# Patient Record
Sex: FEMALE | Race: WHITE | NOT HISPANIC OR LATINO | Employment: OTHER | URBAN - METROPOLITAN AREA
[De-identification: names, ages, dates, MRNs, and addresses within clinical notes are randomized per-mention and may not be internally consistent; named-entity substitution may affect disease eponyms.]

---

## 2020-11-02 ENCOUNTER — TELEPHONE (OUTPATIENT)
Dept: CARDIOLOGY | Facility: CLINIC | Age: 70
End: 2020-11-02

## 2020-11-02 NOTE — TELEPHONE ENCOUNTER
[facility] request transfer of care to there office through [facility].

## 2022-01-13 ENCOUNTER — CLINICAL SUPPORT NO REQUIREMENTS (OUTPATIENT)
Dept: CARDIOLOGY | Facility: CLINIC | Age: 72
End: 2022-01-13

## 2022-01-13 ENCOUNTER — OFFICE VISIT (OUTPATIENT)
Dept: CARDIOLOGY | Facility: CLINIC | Age: 72
End: 2022-01-13

## 2022-01-13 VITALS
HEART RATE: 68 BPM | SYSTOLIC BLOOD PRESSURE: 124 MMHG | OXYGEN SATURATION: 97 % | HEIGHT: 63 IN | DIASTOLIC BLOOD PRESSURE: 69 MMHG | WEIGHT: 239 LBS | BODY MASS INDEX: 42.35 KG/M2

## 2022-01-13 DIAGNOSIS — R00.1 BRADYCARDIA, SINUS: ICD-10-CM

## 2022-01-13 DIAGNOSIS — Z95.0 PRESENCE OF CARDIAC PACEMAKER: ICD-10-CM

## 2022-01-13 DIAGNOSIS — E78.5 DYSLIPIDEMIA: ICD-10-CM

## 2022-01-13 DIAGNOSIS — I10 ESSENTIAL HYPERTENSION: ICD-10-CM

## 2022-01-13 DIAGNOSIS — Z95.0 STATUS POST PLACEMENT OF CARDIAC PACEMAKER: ICD-10-CM

## 2022-01-13 DIAGNOSIS — R07.89 CHEST DISCOMFORT: Primary | ICD-10-CM

## 2022-01-13 DIAGNOSIS — Z95.0 PACEMAKER: Primary | ICD-10-CM

## 2022-01-13 PROCEDURE — 93280 PM DEVICE PROGR EVAL DUAL: CPT | Performed by: INTERNAL MEDICINE

## 2022-01-13 PROCEDURE — 99204 OFFICE O/P NEW MOD 45 MIN: CPT | Performed by: INTERNAL MEDICINE

## 2022-01-13 RX ORDER — ASPIRIN 81 MG/1
81 TABLET ORAL DAILY
COMMUNITY

## 2022-01-13 RX ORDER — CITALOPRAM 40 MG/1
40 TABLET ORAL DAILY
COMMUNITY

## 2022-01-13 RX ORDER — GABAPENTIN 300 MG/1
300 CAPSULE ORAL 3 TIMES DAILY
COMMUNITY

## 2022-01-13 RX ORDER — AMLODIPINE BESYLATE 10 MG/1
10 TABLET ORAL DAILY
COMMUNITY

## 2022-01-13 RX ORDER — PREDNISONE 20 MG/1
TABLET ORAL
COMMUNITY
End: 2022-10-05

## 2022-01-13 RX ORDER — DEXLANSOPRAZOLE 60 MG/1
60 CAPSULE, DELAYED RELEASE ORAL DAILY
COMMUNITY

## 2022-01-13 RX ORDER — GLYBURIDE 5 MG/1
5 TABLET ORAL
COMMUNITY

## 2022-01-13 RX ORDER — LOSARTAN POTASSIUM 25 MG/1
25 TABLET ORAL DAILY
COMMUNITY

## 2022-01-13 RX ORDER — PSEUDOEPHEDRINE HCL 30 MG
TABLET ORAL
Status: ON HOLD | COMMUNITY
End: 2022-10-12

## 2022-01-13 RX ORDER — AMITRIPTYLINE HYDROCHLORIDE 25 MG/1
25 TABLET, FILM COATED ORAL DAILY
COMMUNITY

## 2022-01-13 RX ORDER — ROPINIROLE 0.25 MG/1
TABLET, FILM COATED ORAL
Status: ON HOLD | COMMUNITY
End: 2022-10-12

## 2022-01-13 RX ORDER — FUROSEMIDE 20 MG/1
20 TABLET ORAL DAILY
COMMUNITY

## 2022-01-13 RX ORDER — NITROGLYCERIN 0.4 MG/1
0.4 TABLET SUBLINGUAL
COMMUNITY
End: 2023-01-23 | Stop reason: SDUPTHER

## 2022-01-13 RX ORDER — CLOTRIMAZOLE 1 %
CREAM (GRAM) TOPICAL
Status: ON HOLD | COMMUNITY
End: 2022-10-12

## 2022-01-13 RX ORDER — POLYETHYLENE GLYCOL 3350 17 G/17G
17 POWDER, FOR SOLUTION ORAL DAILY PRN
COMMUNITY

## 2022-01-13 RX ORDER — TRAMADOL HYDROCHLORIDE 50 MG/1
50 TABLET ORAL EVERY 8 HOURS PRN
COMMUNITY

## 2022-01-13 RX ORDER — LORATADINE 10 MG/1
10 TABLET ORAL DAILY
COMMUNITY

## 2022-01-13 RX ORDER — MONTELUKAST SODIUM 10 MG/1
TABLET ORAL
Status: ON HOLD | COMMUNITY
End: 2022-10-12

## 2022-01-13 RX ORDER — OMEPRAZOLE 40 MG/1
40 CAPSULE, DELAYED RELEASE ORAL DAILY
COMMUNITY

## 2022-01-13 RX ORDER — INSULIN DEGLUDEC 200 U/ML
INJECTION, SOLUTION SUBCUTANEOUS
COMMUNITY
End: 2022-10-05

## 2022-01-13 RX ORDER — ALBUTEROL SULFATE 2.5 MG/3ML
2.5 SOLUTION RESPIRATORY (INHALATION) EVERY 4 HOURS PRN
COMMUNITY

## 2022-01-13 RX ORDER — POTASSIUM CHLORIDE 20 MEQ/1
20 TABLET, EXTENDED RELEASE ORAL DAILY
COMMUNITY

## 2022-01-13 RX ORDER — TRAZODONE HYDROCHLORIDE 50 MG/1
50 TABLET ORAL NIGHTLY PRN
COMMUNITY

## 2022-01-13 RX ORDER — HYDROCHLOROTHIAZIDE 50 MG/1
50 TABLET ORAL DAILY
COMMUNITY

## 2022-01-13 RX ORDER — LURASIDONE HYDROCHLORIDE 40 MG/1
40 TABLET, FILM COATED ORAL DAILY
COMMUNITY

## 2022-01-13 RX ORDER — ALBUTEROL SULFATE 90 UG/1
1 AEROSOL, METERED RESPIRATORY (INHALATION) DAILY PRN
COMMUNITY

## 2022-01-13 RX ORDER — CYCLOBENZAPRINE HCL 10 MG
10 TABLET ORAL 3 TIMES DAILY PRN
COMMUNITY

## 2022-01-13 RX ORDER — ARIPIPRAZOLE 20 MG/1
20 TABLET ORAL
COMMUNITY
End: 2022-10-05

## 2022-01-13 NOTE — PROGRESS NOTES
Encounter Date:01/13/2022  Patient was last seen in the office in 2018.      Patient ID: Raya CONTRERAS is a 71 y.o. female.    Chief Complaint:  Chest discomfort  Status post pacemaker  Hypertension  Diabetes  Dyslipidemia      History of Present Illness  Recently patient has been having substernal sharp pain heaviness as though somebody sitting on her chest.  Nonexertional nonradiating.  No other associated aggravating or alleviating factors.    Patient is not having any shortness of breath, palpitations, dizziness or syncope.  Denies having any headache ,abdominal pain ,nausea, vomiting , diarrhea constipation, loss of weight or loss of appetite.  Denies having any excessive bruising ,hematuria or blood in the stool.    Review of all systems negative except as indicated.    Reviewed ROS.      Assessment and Plan     [[[[[[[[[[[[[[[[[[[[[[[[[[    Impression  ==========  -Chest discomfort-possible angina pectoris.  Cardiac catheterization 12/14/2016 revealed normal coronary arteries and normal left ventricular function.      -Status post permanent dual-chamber pacemaker implantation ([email or web address] performed in  [place], [place] ).  2010  Battery status is 18 months.      -Hypertension diabetes dyslipidemia obstructive sleep apnea and COPD      -strong family history of coronary artery disease     -history of pulmonary embolus and DVT in the past.     -Exogenous obesity     - status post appendectomy cholecystectomy partial hysterectomy tonsillectomy adenoidectomy carpal tunnel release bilaterally     -Past smoker     -allergy to morphine and morphine hydrocodone     - medical noncompliance in the past.  ================   plan  =============  Chest discomfort-possible angina pectoris.  Stress Cardiolite test  Echocardiogram    Status post dual-chamber pacemaker implantation 2010.  Interrogation of the pacemaker revealed battery status of 18 months.  Pacemaker site looks normal.    Hypertension- 124/69.    Diabetes-on  medications.    Medications were reviewed and updated.    Followup in the office on the same day.    Further plan will depend on patient's progress.  [[[[[[[[[[[[[[[[[[[[[[[[[[[[[[[             Diagnosis Plan   1. Chest discomfort  Adult Transthoracic Echo Complete W/ Cont if Necessary Per Protocol    Stress Test With Myocardial Perfusion One Day   2. Presence of cardiac pacemaker  Adult Transthoracic Echo Complete W/ Cont if Necessary Per Protocol    Stress Test With Myocardial Perfusion One Day   3. Essential hypertension  Adult Transthoracic Echo Complete W/ Cont if Necessary Per Protocol    Stress Test With Myocardial Perfusion One Day   4. Dyslipidemia  Adult Transthoracic Echo Complete W/ Cont if Necessary Per Protocol    Stress Test With Myocardial Perfusion One Day   5. Status post placement of cardiac pacemaker     LAB RESULTS (LAST 7 DAYS)    CBC        BMP        CMP         BNP        TROPONIN        CoAg        Creatinine Clearance  CrCl cannot be calculated (No successful lab value found.).    ABG        Radiology  No radiology results for the last day                The following portions of the patient's history were reviewed and updated as appropriate: allergies, current medications, past family history, past medical history, past social history, past surgical history and problem list.    Review of Systems   Constitutional: Positive for decreased appetite, malaise/fatigue and weight loss. Negative for fever.   Cardiovascular: Positive for chest pain and leg swelling. Negative for dyspnea on exertion and palpitations.   Respiratory: Positive for shortness of breath. Negative for cough.    Endocrine: Positive for polydipsia and polyphagia.   Skin: Negative for rash.   Musculoskeletal: Positive for back pain, joint pain, joint swelling, neck pain and stiffness.   Gastrointestinal: Negative for abdominal pain, nausea and vomiting.   Genitourinary: Positive for bladder incontinence and urgency.    Neurological: Positive for dizziness and headaches. Negative for focal weakness.   Psychiatric/Behavioral: The patient is nervous/anxious.    All other systems reviewed and are negative.        Current Outpatient Medications:   •  albuterol (PROVENTIL) (2.5 MG/3ML) 0.083% nebulizer solution, albuterol sulfate 2.5 mg/3 mL (0.083 %) solution for nebulization, Disp: , Rfl:   •  albuterol sulfate  (90 Base) MCG/ACT inhaler, albuterol sulfate HFA 90 mcg/actuation aerosol inhaler, Disp: , Rfl:   •  amitriptyline (ELAVIL) 25 MG tablet, amitriptyline 25 mg tablet, Disp: , Rfl:   •  amLODIPine (NORVASC) 10 MG tablet, amlodipine 10 mg tablet  TAKE ONE TABLET BY MOUTH EVERY DAY, Disp: , Rfl:   •  ARIPiprazole (ABILIFY) 20 MG tablet, Take 20 mg by mouth., Disp: , Rfl:   •  aspirin (aspirin) 81 MG EC tablet, aspirin 81 mg tablet,delayed release  Take 1 tablet every day by oral route., Disp: , Rfl:   •  citalopram (CeleXA) 40 MG tablet, citalopram 40 mg tablet, Disp: , Rfl:   •  clotrimazole (LOTRIMIN) 1 % cream, clotrimazole 1 % topical cream, Disp: , Rfl:   •  cyclobenzaprine (FLEXERIL) 10 MG tablet, cyclobenzaprine 10 mg tablet  TAKE ONE TABLET BY MOUTH THREE TIMES DAILY AS NEEDED FOR 10 DAYS, Disp: , Rfl:   •  dexlansoprazole (Dexilant) 60 MG capsule, Dexilant 60 mg capsule, delayed release  TAKE 1 CAPSULE BY MOUTH ONCE DAILY, Disp: , Rfl:   •  diclofenac (VOLTAREN) 50 MG EC tablet, diclofenac sodium 50 mg tablet,delayed release  TAKE ONE TABLET BY MOUTH TWICE DAILY AS NEEDED, Disp: , Rfl:   •  docusate sodium 100 MG capsule, docusate sodium 100 mg capsule  Take 1 capsule every day by oral route as needed for 30 days., Disp: , Rfl:   •  furosemide (Lasix) 20 MG tablet, Lasix 20 mg tablet  Take 1 tablet every day by oral route as directed for 30 days., Disp: , Rfl:   •  gabapentin (NEURONTIN) 300 MG capsule, gabapentin 300 mg capsule  TAKE ONE CAPSULE BY MOUTH THREE TIMES DAILY, Disp: , Rfl:   •  glyburide (DIAbeta) 5  MG tablet, glyburide 5 mg tablet, Disp: , Rfl:   •  hydroCHLOROthiazide (HYDRODIURIL) 50 MG tablet, hydrochlorothiazide 50 mg tablet  Take 1 tablet every day by oral route., Disp: , Rfl:   •  Insulin Degludec (Tresiba FlexTouch) 200 UNIT/ML solution pen-injector pen injection, Tresiba FlexTouch U-200 insulin 200 unit/mL (3 mL) subcutaneous pen  INJECT 40 UNITS SUBCUTANEOUSLY EVERY DAY, Disp: , Rfl:   •  loratadine (CLARITIN) 10 MG tablet, loratadine 10 mg tablet  TAKE ONE TABLET BY MOUTH EVERY DAY, Disp: , Rfl:   •  losartan (COZAAR) 25 MG tablet, losartan 25 mg tablet, Disp: , Rfl:   •  lurasidone (Latuda) 40 MG tablet tablet, Latuda 40 mg tablet  TAKE ONE TABLET BY MOUTH ONCE DAILY, Disp: , Rfl:   •  montelukast (SINGULAIR) 10 MG tablet, montelukast 10 mg tablet  TAKE ONE TABLET BY MOUTH EVERY DAY AS DIRECTED, Disp: , Rfl:   •  nitroglycerin (NITROSTAT) 0.4 MG SL tablet, nitroglycerin 0.4 mg sublingual tablet  DISSOLVE ONE TABLET UNDER THE TONGUE EVERY 5 MINUTES AS NEEDED FOR CHEST PAIN, Disp: , Rfl:   •  omeprazole (priLOSEC) 40 MG capsule, omeprazole 40 mg capsule,delayed release  TAKE ONE CAPSULE BY MOUTH EVERY DAY, Disp: , Rfl:   •  polyethylene glycol (MiraLax) 17 GM/SCOOP powder, 17 g., Disp: , Rfl:   •  potassium chloride (K-DUR,KLOR-CON) 20 MEQ CR tablet, potassium chloride ER 20 mEq tablet,extended release(part/cryst)  TAKE ONE TABLET BY MOUTH EVERY DAY, Disp: , Rfl:   •  predniSONE (DELTASONE) 20 MG tablet, prednisone 20 mg tablet  TAKE ONE TABLET BY MOUTH EVERY DAY, Disp: , Rfl:   •  rOPINIRole (Requip) 0.25 MG tablet, Requip 0.25 mg tablet  Start 1 tablet by mouth nightly x 2 days, then increase to 2 tablets nightly x 5 days, then increase to 4 tablets nightly., Disp: , Rfl:   •  traMADol (ULTRAM) 50 MG tablet, tramadol 50 mg tablet  TAKE 1 TABLET BY MOUTH THREE TIMES DAILY AS NEEDED, Disp: , Rfl:   •  traZODone (DESYREL) 50 MG tablet, trazodone 50 mg tablet  TAKE ONE TABLET BY MOUTH AT BEDTIME AS  "NEEDED, Disp: , Rfl:     Allergies   Allergen Reactions   • Hydrocodone-Acetaminophen Rash   • Morphine Rash   • Penicillins Rash       Family History   Problem Relation Age of Onset   • Arthritis Mother    • Hypertension Mother    • Heart disease Mother    • Cancer Mother    • Diabetes Mother    • Gout Father    • Heart failure Father    • Heart disease Father    • Diabetes Father        Past Surgical History:   Procedure Laterality Date   • APPENDECTOMY     • CHOLECYSTECTOMY     • DORSAL COMPARTMENT RELEASE     • INCONTINENCE SURGERY     • INSERT / REPLACE / REMOVE PACEMAKER     • TONSILLECTOMY AND ADENOIDECTOMY         Past Medical History:   Diagnosis Date   • Arthritis    • Asthma    • Bronchitis    • Claustrophobia    • Coronary artery disease    • Diabetes mellitus (HCC)    • Emphysema/COPD (HCC)    • Heart failure (HCC)    • History of back pain    • History of bladder infections    • Hypertension    • Myocardial infarction (HCC)    • Osteoporosis    • Pneumonia    • Stroke (HCC)        Family History   Problem Relation Age of Onset   • Arthritis Mother    • Hypertension Mother    • Heart disease Mother    • Cancer Mother    • Diabetes Mother    • Gout Father    • Heart failure Father    • Heart disease Father    • Diabetes Father        Social History     Socioeconomic History   • Marital status:    Tobacco Use   • Smoking status: Former Smoker     Packs/day: 1.00     Years: 60.00     Pack years: 60.00     Types: Cigarettes     Quit date:      Years since quittin.0   • Smokeless tobacco: Never Used   Vaping Use   • Vaping Use: Never used   Substance and Sexual Activity   • Alcohol use: Yes   • Drug use: Never   • Sexual activity: Defer         Procedures      Objective:       Physical Exam    /69 (BP Location: Left arm, Patient Position: Sitting, Cuff Size: Large Adult)   Pulse 68   Ht 160 cm (63\")   Wt 108 kg (239 lb)   SpO2 97%   BMI 42.34 kg/m²   The patient is alert, oriented " and in no distress.    Vital signs as noted above.    Head and neck revealed no carotid bruits or jugular venous distension.  No thyromegaly or lymphadenopathy is present.    Lungs clear.  No wheezing.  Breath sounds are normal bilaterally.    Heart normal first and second heart sounds.  No murmur..  No pericardial rub is present.  No gallop is present.    Abdomen soft and nontender.  No organomegaly is present.    Extremities revealed good peripheral pulses without any pedal edema.    Skin warm and dry.  Pacemaker site looks normal.    Musculoskeletal system is grossly normal.    CNS grossly normal.

## 2022-01-19 ENCOUNTER — TELEPHONE (OUTPATIENT)
Dept: CARDIOLOGY | Facility: CLINIC | Age: 72
End: 2022-01-19

## 2022-01-26 ENCOUNTER — APPOINTMENT (OUTPATIENT)
Dept: CARDIOLOGY | Facility: HOSPITAL | Age: 72
End: 2022-01-26

## 2022-03-09 PROCEDURE — 93294 REM INTERROG EVL PM/LDLS PM: CPT | Performed by: INTERNAL MEDICINE

## 2022-03-09 PROCEDURE — 93296 REM INTERROG EVL PM/IDS: CPT | Performed by: INTERNAL MEDICINE

## 2022-03-16 ENCOUNTER — APPOINTMENT (OUTPATIENT)
Dept: CARDIOLOGY | Facility: HOSPITAL | Age: 72
End: 2022-03-16

## 2022-03-16 ENCOUNTER — APPOINTMENT (OUTPATIENT)
Dept: NUCLEAR MEDICINE | Facility: HOSPITAL | Age: 72
End: 2022-03-16

## 2022-03-16 ENCOUNTER — HOSPITAL ENCOUNTER (OUTPATIENT)
Dept: NUCLEAR MEDICINE | Facility: HOSPITAL | Age: 72
End: 2022-03-16

## 2022-04-06 ENCOUNTER — HOSPITAL ENCOUNTER (OUTPATIENT)
Dept: NUCLEAR MEDICINE | Facility: HOSPITAL | Age: 72
Discharge: HOME | End: 2022-04-06

## 2022-04-06 ENCOUNTER — OFFICE VISIT (OUTPATIENT)
Dept: CARDIOLOGY | Facility: CLINIC | Age: 72
End: 2022-04-06

## 2022-04-06 ENCOUNTER — HOSPITAL ENCOUNTER (OUTPATIENT)
Dept: CARDIOLOGY | Facility: HOSPITAL | Age: 72
Discharge: HOME | End: 2022-04-06

## 2022-04-06 VITALS
HEIGHT: 63 IN | WEIGHT: 241 LBS | BODY MASS INDEX: 42.7 KG/M2 | OXYGEN SATURATION: 96 % | DIASTOLIC BLOOD PRESSURE: 87 MMHG | HEART RATE: 88 BPM | SYSTOLIC BLOOD PRESSURE: 155 MMHG

## 2022-04-06 DIAGNOSIS — Z95.0 STATUS POST PLACEMENT OF CARDIAC PACEMAKER: ICD-10-CM

## 2022-04-06 DIAGNOSIS — I10 ESSENTIAL HYPERTENSION: ICD-10-CM

## 2022-04-06 DIAGNOSIS — R07.89 CHEST DISCOMFORT: ICD-10-CM

## 2022-04-06 DIAGNOSIS — E78.5 DYSLIPIDEMIA: ICD-10-CM

## 2022-04-06 DIAGNOSIS — Z95.0 PRESENCE OF CARDIAC PACEMAKER: ICD-10-CM

## 2022-04-06 DIAGNOSIS — R07.89 CHEST DISCOMFORT: Primary | ICD-10-CM

## 2022-04-06 LAB
BH CV ECHO MEAS - ACS: 2.15 CM
BH CV ECHO MEAS - AO MAX PG: 5.7 MMHG
BH CV ECHO MEAS - AO MEAN PG: 2.6 MMHG
BH CV ECHO MEAS - AO ROOT DIAM: 3 CM
BH CV ECHO MEAS - AO V2 MAX: 119.3 CM/SEC
BH CV ECHO MEAS - AO V2 VTI: 29.3 CM
BH CV ECHO MEAS - AVA(I,D): 3.1 CM2
BH CV ECHO MEAS - EDV(CUBED): 108.5 ML
BH CV ECHO MEAS - EDV(MOD-SP4): 86.6 ML
BH CV ECHO MEAS - EF(MOD-BP): 54 %
BH CV ECHO MEAS - EF(MOD-SP4): 53.8 %
BH CV ECHO MEAS - ESV(CUBED): 28.2 ML
BH CV ECHO MEAS - ESV(MOD-SP4): 40 ML
BH CV ECHO MEAS - FS: 36.2 %
BH CV ECHO MEAS - IVS/LVPW: 0.76 CM
BH CV ECHO MEAS - IVSD: 0.87 CM
BH CV ECHO MEAS - LA DIMENSION(2D): 3.6 CM
BH CV ECHO MEAS - LV DIASTOLIC VOL/BSA (35-75): 42.2 CM2
BH CV ECHO MEAS - LV MASS(C)D: 169.5 GRAMS
BH CV ECHO MEAS - LV MAX PG: 4.1 MMHG
BH CV ECHO MEAS - LV MEAN PG: 1.96 MMHG
BH CV ECHO MEAS - LV SYSTOLIC VOL/BSA (12-30): 19.5 CM2
BH CV ECHO MEAS - LV V1 MAX: 101.2 CM/SEC
BH CV ECHO MEAS - LV V1 VTI: 29 CM
BH CV ECHO MEAS - LVIDD: 4.8 CM
BH CV ECHO MEAS - LVIDS: 3 CM
BH CV ECHO MEAS - LVOT AREA: 3.1 CM2
BH CV ECHO MEAS - LVOT DIAM: 1.99 CM
BH CV ECHO MEAS - LVPWD: 1.14 CM
BH CV ECHO MEAS - MV A MAX VEL: 108.7 CM/SEC
BH CV ECHO MEAS - MV DEC SLOPE: 201.4 CM/SEC2
BH CV ECHO MEAS - MV DEC TIME: 0.29 MSEC
BH CV ECHO MEAS - MV E MAX VEL: 59.2 CM/SEC
BH CV ECHO MEAS - MV E/A: 0.54
BH CV ECHO MEAS - MV MAX PG: 4.5 MMHG
BH CV ECHO MEAS - MV MEAN PG: 1.46 MMHG
BH CV ECHO MEAS - MV V2 VTI: 27.1 CM
BH CV ECHO MEAS - MVA(VTI): 3.3 CM2
BH CV ECHO MEAS - PA V2 MAX: 98 CM/SEC
BH CV ECHO MEAS - RAP SYSTOLE: 3 MMHG
BH CV ECHO MEAS - RV MAX PG: 1.78 MMHG
BH CV ECHO MEAS - RV V1 MAX: 66.7 CM/SEC
BH CV ECHO MEAS - RV V1 VTI: 17.3 CM
BH CV ECHO MEAS - RVDD: 3.1 CM
BH CV ECHO MEAS - RVSP: 29.7 MMHG
BH CV ECHO MEAS - SI(MOD-SP4): 22.7 ML/M2
BH CV ECHO MEAS - SV(LVOT): 90.2 ML
BH CV ECHO MEAS - SV(MOD-SP4): 46.5 ML
BH CV ECHO MEAS - TR MAX PG: 26.7 MMHG
BH CV ECHO MEAS - TR MAX VEL: 258.2 CM/SEC
BH CV REST NUCLEAR ISOTOPE DOSE: 7.4 MCI
BH CV STRESS BP STAGE 1: NORMAL
BH CV STRESS BP STAGE 2: NORMAL
BH CV STRESS BP STAGE 3: NORMAL
BH CV STRESS BP STAGE 4: NORMAL
BH CV STRESS COMMENTS STAGE 1: NORMAL
BH CV STRESS COMMENTS STAGE 2: NORMAL
BH CV STRESS DOSE REGADENOSON STAGE 1: 0.4
BH CV STRESS DURATION MIN STAGE 1: 0
BH CV STRESS DURATION MIN STAGE 2: 4
BH CV STRESS DURATION SEC STAGE 1: 10
BH CV STRESS DURATION SEC STAGE 2: 0
BH CV STRESS HR STAGE 1: 72
BH CV STRESS HR STAGE 2: 70
BH CV STRESS HR STAGE 3: 61
BH CV STRESS HR STAGE 4: 61
BH CV STRESS NUCLEAR ISOTOPE DOSE: 21.9 MCI
BH CV STRESS PROTOCOL 1: NORMAL
BH CV STRESS RECOVERY BP: NORMAL MMHG
BH CV STRESS RECOVERY HR: 61 BPM
BH CV STRESS STAGE 1: 1
BH CV STRESS STAGE 2: 2
BH CV STRESS STAGE 3: 3
BH CV STRESS STAGE 4: 4
LV EF 2D ECHO EST: 60 %
MAXIMAL PREDICTED HEART RATE: 151 BPM
MAXIMAL PREDICTED HEART RATE: 151 BPM
PERCENT MAX PREDICTED HR: 47.68 %
STRESS BASELINE BP: NORMAL MMHG
STRESS BASELINE HR: 62 BPM
STRESS PERCENT HR: 56 %
STRESS POST PEAK BP: NORMAL MMHG
STRESS POST PEAK HR: 72 BPM
STRESS TARGET HR: 128 BPM
STRESS TARGET HR: 128 BPM

## 2022-04-06 PROCEDURE — A9502 TC99M TETROFOSMIN: HCPCS | Performed by: INTERNAL MEDICINE

## 2022-04-06 PROCEDURE — 0 TECHNETIUM TETROFOSMIN KIT: Performed by: INTERNAL MEDICINE

## 2022-04-06 PROCEDURE — 93018 CV STRESS TEST I&R ONLY: CPT | Performed by: INTERNAL MEDICINE

## 2022-04-06 PROCEDURE — 93017 CV STRESS TEST TRACING ONLY: CPT

## 2022-04-06 PROCEDURE — 93306 TTE W/DOPPLER COMPLETE: CPT

## 2022-04-06 PROCEDURE — 99213 OFFICE O/P EST LOW 20 MIN: CPT | Performed by: INTERNAL MEDICINE

## 2022-04-06 PROCEDURE — 78452 HT MUSCLE IMAGE SPECT MULT: CPT | Performed by: INTERNAL MEDICINE

## 2022-04-06 PROCEDURE — 78452 HT MUSCLE IMAGE SPECT MULT: CPT

## 2022-04-06 PROCEDURE — 93306 TTE W/DOPPLER COMPLETE: CPT | Performed by: INTERNAL MEDICINE

## 2022-04-06 PROCEDURE — 25010000002 REGADENOSON 0.4 MG/5ML SOLUTION: Performed by: INTERNAL MEDICINE

## 2022-04-06 RX ADMIN — TETROFOSMIN 1 DOSE: 1.38 INJECTION, POWDER, LYOPHILIZED, FOR SOLUTION INTRAVENOUS at 11:06

## 2022-04-06 RX ADMIN — REGADENOSON 0.4 MG: 0.08 INJECTION, SOLUTION INTRAVENOUS at 11:06

## 2022-04-06 RX ADMIN — TETROFOSMIN 1 DOSE: 1.38 INJECTION, POWDER, LYOPHILIZED, FOR SOLUTION INTRAVENOUS at 09:55

## 2022-04-06 NOTE — PROGRESS NOTES
Encounter Date:04/06/2022      Patient ID: Raya May is a 71 y.o. female.    Chief Complaint:  Chest discomfort  Status post pacemaker  Hypertension  Diabetes  Dyslipidemia        History of Present Illness  Patient recently was seen with following history.  Reviewed and updated.    Recently patient has been having substernal sharp pain heaviness as though somebody sitting on her chest.  Nonexertional nonradiating.  No other associated aggravating or alleviating factors.     Patient is not having any shortness of breath, palpitations, dizziness or syncope.  Denies having any headache ,abdominal pain ,nausea, vomiting , diarrhea constipation, loss of weight or loss of appetite.  Denies having any excessive bruising ,hematuria or blood in the stool.     Review of all systems negative except as indicated.     Reviewed ROS.        Assessment and Plan      [[[[[[[[[[[[[[[[[[[[[[[[[[    Impression  ==========  -Chest discomfort-possible angina pectoris.  Cardiac catheterization 12/14/2016 revealed normal coronary arteries and normal left ventricular function.  Echocardiogram-normal 4/6/2022.  Lexiscan Cardiolite test-normal 4/6/2022       -Status post permanent dual-chamber pacemaker implantation ([employer] performed in  [place], [place] ).  2010  Battery status is 18 months.      -Hypertension diabetes dyslipidemia obstructive sleep apnea and COPD      -strong family history of coronary artery disease     -history of pulmonary embolus and DVT in the past.     -Exogenous obesity     - status post appendectomy cholecystectomy partial hysterectomy tonsillectomy adenoidectomy carpal tunnel release bilaterally     -Past smoker     -allergy to morphine and morphine hydrocodone     - medical noncompliance in the past.  ================   plan  =============  Chest discomfort-possible angina pectoris.  Stress Cardiolite test-normal as above  Echocardiogram-normal as above     Status post dual-chamber pacemaker implantation  2010.  Interrogation of the pacemaker revealed battery status of 18 months.  Pacemaker site looks normal.     Hypertension- 124/69.     Diabetes-on medications.     Medications were reviewed and updated.     Followup in the office  weeks.  Consider cardiac catheterization if patient has continued symptoms.     Further plan will depend on patient's progress.  [[[[[[[[[[[[[[[[[[[[[[[[[[[[[[[                Diagnosis Plan   1. Chest discomfort     2. Presence of cardiac pacemaker     3. Essential hypertension     4. Dyslipidemia     5. Status post placement of cardiac pacemaker     LAB RESULTS (LAST 7 DAYS)    CBC        BMP        CMP         BNP        TROPONIN        CoAg        Creatinine Clearance  CrCl cannot be calculated (No successful lab value found.).    ABG        Radiology  No radiology results for the last day                The following portions of the patient's history were reviewed and updated as appropriate: allergies, current medications, past family history, past medical history, past social history, past surgical history and problem list.    Review of Systems   Constitutional: Negative for malaise/fatigue.   Cardiovascular: Negative for chest pain, leg swelling, palpitations and syncope.   Respiratory: Negative for shortness of breath.    Skin: Negative for rash.   Gastrointestinal: Negative for nausea and vomiting.   Neurological: Negative for dizziness, light-headedness and numbness.   All other systems reviewed and are negative.        Current Outpatient Medications:   •  albuterol (PROVENTIL) (2.5 MG/3ML) 0.083% nebulizer solution, albuterol sulfate 2.5 mg/3 mL (0.083 %) solution for nebulization, Disp: , Rfl:   •  albuterol sulfate  (90 Base) MCG/ACT inhaler, albuterol sulfate HFA 90 mcg/actuation aerosol inhaler, Disp: , Rfl:   •  amitriptyline (ELAVIL) 25 MG tablet, amitriptyline 25 mg tablet, Disp: , Rfl:   •  amLODIPine (NORVASC) 10 MG tablet, amlodipine 10 mg tablet  TAKE ONE  TABLET BY MOUTH EVERY DAY, Disp: , Rfl:   •  ARIPiprazole (ABILIFY) 20 MG tablet, Take 20 mg by mouth., Disp: , Rfl:   •  aspirin 81 MG EC tablet, aspirin 81 mg tablet,delayed release  Take 1 tablet every day by oral route., Disp: , Rfl:   •  citalopram (CeleXA) 40 MG tablet, citalopram 40 mg tablet, Disp: , Rfl:   •  clotrimazole (LOTRIMIN) 1 % cream, clotrimazole 1 % topical cream, Disp: , Rfl:   •  cyclobenzaprine (FLEXERIL) 10 MG tablet, cyclobenzaprine 10 mg tablet  TAKE ONE TABLET BY MOUTH THREE TIMES DAILY AS NEEDED FOR 10 DAYS, Disp: , Rfl:   •  dexlansoprazole (DEXILANT) 60 MG capsule, Dexilant 60 mg capsule, delayed release  TAKE 1 CAPSULE BY MOUTH ONCE DAILY, Disp: , Rfl:   •  diclofenac (VOLTAREN) 50 MG EC tablet, diclofenac sodium 50 mg tablet,delayed release  TAKE ONE TABLET BY MOUTH TWICE DAILY AS NEEDED, Disp: , Rfl:   •  docusate sodium 100 MG capsule, docusate sodium 100 mg capsule  Take 1 capsule every day by oral route as needed for 30 days., Disp: , Rfl:   •  furosemide (LASIX) 20 MG tablet, Lasix 20 mg tablet  Take 1 tablet every day by oral route as directed for 30 days., Disp: , Rfl:   •  gabapentin (NEURONTIN) 300 MG capsule, gabapentin 300 mg capsule  TAKE ONE CAPSULE BY MOUTH THREE TIMES DAILY, Disp: , Rfl:   •  glyburide (DIAbeta) 5 MG tablet, glyburide 5 mg tablet, Disp: , Rfl:   •  hydroCHLOROthiazide (HYDRODIURIL) 50 MG tablet, hydrochlorothiazide 50 mg tablet  Take 1 tablet every day by oral route., Disp: , Rfl:   •  Insulin Degludec (Tresiba FlexTouch) 200 UNIT/ML solution pen-injector pen injection, [employer] FlexTouch U-200 insulin 200 unit/mL (3 mL) subcutaneous pen  INJECT 40 UNITS SUBCUTANEOUSLY EVERY DAY, Disp: , Rfl:   •  loratadine (CLARITIN) 10 MG tablet, loratadine 10 mg tablet  TAKE ONE TABLET BY MOUTH EVERY DAY, Disp: , Rfl:   •  losartan (COZAAR) 25 MG tablet, losartan 25 mg tablet, Disp: , Rfl:   •  lurasidone (LATUDA) 40 MG tablet tablet, Latuda 40 mg tablet  TAKE ONE  TABLET BY MOUTH ONCE DAILY, Disp: , Rfl:   •  montelukast (SINGULAIR) 10 MG tablet, montelukast 10 mg tablet  TAKE ONE TABLET BY MOUTH EVERY DAY AS DIRECTED, Disp: , Rfl:   •  nitroglycerin (NITROSTAT) 0.4 MG SL tablet, nitroglycerin 0.4 mg sublingual tablet  DISSOLVE ONE TABLET UNDER THE TONGUE EVERY 5 MINUTES AS NEEDED FOR CHEST PAIN, Disp: , Rfl:   •  omeprazole (priLOSEC) 40 MG capsule, omeprazole 40 mg capsule,delayed release  TAKE ONE CAPSULE BY MOUTH EVERY DAY, Disp: , Rfl:   •  polyethylene glycol (MIRALAX) 17 GM/SCOOP powder, 17 g., Disp: , Rfl:   •  potassium chloride (K-DUR,KLOR-CON) 20 MEQ CR tablet, potassium chloride ER 20 mEq tablet,extended release(part/cryst)  TAKE ONE TABLET BY MOUTH EVERY DAY, Disp: , Rfl:   •  predniSONE (DELTASONE) 20 MG tablet, prednisone 20 mg tablet  TAKE ONE TABLET BY MOUTH EVERY DAY, Disp: , Rfl:   •  rOPINIRole (REQUIP) 0.25 MG tablet, Requip 0.25 mg tablet  Start 1 tablet by mouth nightly x 2 days, then increase to 2 tablets nightly x 5 days, then increase to 4 tablets nightly., Disp: , Rfl:   •  traMADol (ULTRAM) 50 MG tablet, tramadol 50 mg tablet  TAKE 1 TABLET BY MOUTH THREE TIMES DAILY AS NEEDED, Disp: , Rfl:   •  traZODone (DESYREL) 50 MG tablet, trazodone 50 mg tablet  TAKE ONE TABLET BY MOUTH AT BEDTIME AS NEEDED, Disp: , Rfl:   No current facility-administered medications for this visit.    Allergies   Allergen Reactions   • Hydrocodone-Acetaminophen Rash   • Morphine Rash   • Penicillins Rash       Family History   Problem Relation Age of Onset   • Arthritis Mother    • Hypertension Mother    • Heart disease Mother    • Cancer Mother    • Diabetes Mother    • Gout Father    • Heart failure Father    • Heart disease Father    • Diabetes Father        Past Surgical History:   Procedure Laterality Date   • APPENDECTOMY     • CHOLECYSTECTOMY     • DORSAL COMPARTMENT RELEASE     • INCONTINENCE SURGERY     • INSERT / REPLACE / REMOVE PACEMAKER     • TONSILLECTOMY AND  "ADENOIDECTOMY         Past Medical History:   Diagnosis Date   • Arthritis    • Asthma    • Bronchitis    • Claustrophobia    • Coronary artery disease    • Diabetes mellitus (HCC)    • Emphysema/COPD (HCC)    • Heart failure (HCC)    • History of back pain    • History of bladder infections    • Hypertension    • Myocardial infarction (HCC)    • Osteoporosis    • Pneumonia    • Stroke (HCC)        Family History   Problem Relation Age of Onset   • Arthritis Mother    • Hypertension Mother    • Heart disease Mother    • Cancer Mother    • Diabetes Mother    • Gout Father    • Heart failure Father    • Heart disease Father    • Diabetes Father        Social History     Socioeconomic History   • Marital status:    Tobacco Use   • Smoking status: Former Smoker     Packs/day: 1.00     Years: 60.00     Pack years: 60.00     Types: Cigarettes     Quit date:      Years since quittin.2   • Smokeless tobacco: Never Used   Vaping Use   • Vaping Use: Never used   Substance and Sexual Activity   • Alcohol use: Yes   • Drug use: Never   • Sexual activity: Defer         Procedures      Objective:       Physical Exam    /87 (BP Location: Right arm, Patient Position: Sitting, Cuff Size: Adult) Comment (BP Location): LOWER ARM  Pulse 88   Ht 160 cm (63\")   Wt 109 kg (241 lb)   SpO2 96%   BMI 42.69 kg/m²   The patient is alert, oriented and in no distress.    Vital signs as noted above.    Head and neck revealed no carotid bruits or jugular venous distension.  No thyromegaly or lymphadenopathy is present.    Lungs clear.  No wheezing.  Breath sounds are normal bilaterally.    Heart normal first and second heart sounds.  No murmur..  No pericardial rub is present.  No gallop is present.    Abdomen soft and nontender.  No organomegaly is present.    Extremities revealed good peripheral pulses without any pedal edema.    Skin warm and dry.  Pacemaker site looks normal.    Musculoskeletal system is grossly " normal.    CNS grossly normal.    Reviewed and updated.

## 2022-09-07 PROCEDURE — 93296 REM INTERROG EVL PM/IDS: CPT | Performed by: INTERNAL MEDICINE

## 2022-09-07 PROCEDURE — 93294 REM INTERROG EVL PM/LDLS PM: CPT | Performed by: INTERNAL MEDICINE

## 2022-10-05 ENCOUNTER — TELEPHONE (OUTPATIENT)
Dept: CARDIOLOGY | Facility: CLINIC | Age: 72
End: 2022-10-05

## 2022-10-05 ENCOUNTER — CLINICAL SUPPORT NO REQUIREMENTS (OUTPATIENT)
Dept: CARDIOLOGY | Facility: CLINIC | Age: 72
End: 2022-10-05

## 2022-10-05 ENCOUNTER — OFFICE VISIT (OUTPATIENT)
Dept: CARDIOLOGY | Facility: CLINIC | Age: 72
End: 2022-10-05

## 2022-10-05 VITALS
HEART RATE: 71 BPM | OXYGEN SATURATION: 95 % | HEIGHT: 63 IN | SYSTOLIC BLOOD PRESSURE: 144 MMHG | DIASTOLIC BLOOD PRESSURE: 73 MMHG | BODY MASS INDEX: 44.03 KG/M2 | WEIGHT: 248.5 LBS

## 2022-10-05 DIAGNOSIS — Z95.0 STATUS POST PLACEMENT OF CARDIAC PACEMAKER: ICD-10-CM

## 2022-10-05 DIAGNOSIS — Z95.0 PRESENCE OF CARDIAC PACEMAKER: ICD-10-CM

## 2022-10-05 DIAGNOSIS — R00.1 BRADYCARDIA, SINUS: ICD-10-CM

## 2022-10-05 DIAGNOSIS — I10 ESSENTIAL HYPERTENSION: ICD-10-CM

## 2022-10-05 DIAGNOSIS — Z95.0 PACEMAKER: Primary | ICD-10-CM

## 2022-10-05 DIAGNOSIS — Z95.0 PACEMAKER: ICD-10-CM

## 2022-10-05 DIAGNOSIS — R07.89 CHEST DISCOMFORT: Primary | ICD-10-CM

## 2022-10-05 DIAGNOSIS — E78.5 DYSLIPIDEMIA: ICD-10-CM

## 2022-10-05 PROCEDURE — 99214 OFFICE O/P EST MOD 30 MIN: CPT | Performed by: INTERNAL MEDICINE

## 2022-10-05 PROCEDURE — 93280 PM DEVICE PROGR EVAL DUAL: CPT | Performed by: INTERNAL MEDICINE

## 2022-10-05 PROCEDURE — 93000 ELECTROCARDIOGRAM COMPLETE: CPT | Performed by: INTERNAL MEDICINE

## 2022-10-05 RX ORDER — LISINOPRIL 40 MG/1
40 TABLET ORAL
Status: ON HOLD | COMMUNITY
End: 2022-10-12

## 2022-10-05 RX ORDER — INSULIN DEGLUDEC INJECTION 100 U/ML
35 INJECTION, SOLUTION SUBCUTANEOUS
COMMUNITY
Start: 2022-09-22

## 2022-10-05 RX ORDER — ARIPIPRAZOLE 10 MG/1
10 TABLET ORAL DAILY
COMMUNITY

## 2022-10-05 RX ORDER — GUAIFENESIN AND CODEINE PHOSPHATE 100; 10 MG/5ML; MG/5ML
10 SOLUTION ORAL EVERY 4 HOURS PRN
COMMUNITY

## 2022-10-05 RX ORDER — LUMATEPERONE 42 MG/1
CAPSULE ORAL
Status: ON HOLD | COMMUNITY
End: 2022-10-12

## 2022-10-05 RX ORDER — BENZONATATE 200 MG/1
CAPSULE ORAL EVERY 8 HOURS SCHEDULED
Status: ON HOLD | COMMUNITY
End: 2022-10-12

## 2022-10-05 RX ORDER — AZITHROMYCIN 250 MG/1
250 TABLET, FILM COATED ORAL DAILY
COMMUNITY

## 2022-10-05 RX ORDER — HYDROCODONE BITARTRATE AND ACETAMINOPHEN 5; 325 MG/1; MG/1
1 TABLET ORAL EVERY 12 HOURS PRN
COMMUNITY
Start: 2022-08-29

## 2022-10-05 RX ORDER — FLUCONAZOLE 150 MG/1
150 TABLET ORAL DAILY
COMMUNITY

## 2022-10-05 RX ORDER — LEVOFLOXACIN 500 MG/1
500 TABLET, FILM COATED ORAL DAILY
Status: ON HOLD | COMMUNITY
End: 2022-10-21

## 2022-10-05 RX ORDER — ONDANSETRON 8 MG/1
8 TABLET, ORALLY DISINTEGRATING ORAL EVERY 12 HOURS PRN
COMMUNITY

## 2022-10-05 RX ORDER — ISOSORBIDE MONONITRATE 30 MG/1
30 TABLET, EXTENDED RELEASE ORAL DAILY
COMMUNITY
Start: 2022-10-03

## 2022-10-05 NOTE — TELEPHONE ENCOUNTER
Called patient, LMOM to remind her to send a monthly pacemaker check at the beginning of the month to monitor battery status.

## 2022-10-10 ENCOUNTER — HOSPITAL ENCOUNTER (OUTPATIENT)
Dept: GENERAL RADIOLOGY | Facility: HOSPITAL | Age: 72
Discharge: HOME | End: 2022-10-10

## 2022-10-10 ENCOUNTER — HOSPITAL ENCOUNTER (EMERGENCY)
Facility: HOSPITAL | Age: 72
Discharge: LEFT BEFORE DISCHARGE | End: 2022-10-10
Attending: EMERGENCY MEDICINE | Admitting: EMERGENCY MEDICINE

## 2022-10-10 ENCOUNTER — NURSE TRIAGE (OUTPATIENT)
Dept: CALL CENTER | Facility: HOSPITAL | Age: 72
End: 2022-10-10

## 2022-10-10 ENCOUNTER — LAB (OUTPATIENT)
Dept: LAB | Facility: HOSPITAL | Age: 72
End: 2022-10-10

## 2022-10-10 ENCOUNTER — TELEPHONE (OUTPATIENT)
Dept: CARDIOLOGY | Facility: CLINIC | Age: 72
End: 2022-10-10

## 2022-10-10 ENCOUNTER — OFFICE VISIT (OUTPATIENT)
Dept: CARDIOLOGY | Facility: CLINIC | Age: 72
End: 2022-10-10

## 2022-10-10 ENCOUNTER — HOSPITAL ENCOUNTER (OUTPATIENT)
Dept: CARDIOLOGY | Facility: HOSPITAL | Age: 72
Discharge: HOME | End: 2022-10-10

## 2022-10-10 VITALS
HEART RATE: 75 BPM | SYSTOLIC BLOOD PRESSURE: 127 MMHG | OXYGEN SATURATION: 98 % | WEIGHT: 248 LBS | HEIGHT: 63 IN | TEMPERATURE: 97.7 F | DIASTOLIC BLOOD PRESSURE: 77 MMHG | RESPIRATION RATE: 16 BRPM | BODY MASS INDEX: 43.94 KG/M2

## 2022-10-10 VITALS
WEIGHT: 250.6 LBS | DIASTOLIC BLOOD PRESSURE: 79 MMHG | BODY MASS INDEX: 44.4 KG/M2 | OXYGEN SATURATION: 95 % | SYSTOLIC BLOOD PRESSURE: 142 MMHG | HEART RATE: 83 BPM | HEIGHT: 63 IN

## 2022-10-10 DIAGNOSIS — I10 ESSENTIAL HYPERTENSION: ICD-10-CM

## 2022-10-10 DIAGNOSIS — R00.1 BRADYCARDIA, SINUS: ICD-10-CM

## 2022-10-10 DIAGNOSIS — E78.5 DYSLIPIDEMIA: ICD-10-CM

## 2022-10-10 DIAGNOSIS — Z95.0 PACEMAKER: ICD-10-CM

## 2022-10-10 DIAGNOSIS — Z95.0 PRESENCE OF CARDIAC PACEMAKER: ICD-10-CM

## 2022-10-10 DIAGNOSIS — Z95.0 STATUS POST PLACEMENT OF CARDIAC PACEMAKER: Primary | ICD-10-CM

## 2022-10-10 DIAGNOSIS — Z95.0 STATUS POST PLACEMENT OF CARDIAC PACEMAKER: ICD-10-CM

## 2022-10-10 DIAGNOSIS — R07.89 CHEST DISCOMFORT: ICD-10-CM

## 2022-10-10 LAB
ANION GAP SERPL CALCULATED.3IONS-SCNC: 9 MMOL/L (ref 5–15)
BUN SERPL-MCNC: 25 MG/DL (ref 8–23)
BUN/CREAT SERPL: 27.2 (ref 7–25)
CALCIUM SPEC-SCNC: 8.9 MG/DL (ref 8.6–10.5)
CHLORIDE SERPL-SCNC: 105 MMOL/L (ref 98–107)
CHOLEST SERPL-MCNC: 178 MG/DL (ref 0–200)
CO2 SERPL-SCNC: 27 MMOL/L (ref 22–29)
CREAT SERPL-MCNC: 0.92 MG/DL (ref 0.57–1)
DEPRECATED RDW RBC AUTO: 37.7 FL (ref 37–54)
EGFRCR SERPLBLD CKD-EPI 2021: 67.1 ML/MIN/1.73
ERYTHROCYTE [DISTWIDTH] IN BLOOD BY AUTOMATED COUNT: 12.6 % (ref 12.3–15.4)
GLUCOSE SERPL-MCNC: 84 MG/DL (ref 65–99)
HCT VFR BLD AUTO: 40.5 % (ref 34–46.6)
HDLC SERPL-MCNC: 58 MG/DL (ref 40–60)
HGB BLD-MCNC: 13.6 G/DL (ref 12–15.9)
INR PPP: 1 (ref 0.93–1.1)
LDLC SERPL CALC-MCNC: 95 MG/DL (ref 0–100)
LDLC/HDLC SERPL: 1.57 {RATIO}
MCH RBC QN AUTO: 27.8 PG (ref 26.6–33)
MCHC RBC AUTO-ENTMCNC: 33.6 G/DL (ref 31.5–35.7)
MCV RBC AUTO: 82.7 FL (ref 79–97)
PLATELET # BLD AUTO: 258 10*3/MM3 (ref 140–450)
PMV BLD AUTO: 10.6 FL (ref 6–12)
POTASSIUM SERPL-SCNC: 4.1 MMOL/L (ref 3.5–5.2)
PROTHROMBIN TIME: 10.3 SECONDS (ref 9.6–11.7)
QT INTERVAL: 433 MS
RBC # BLD AUTO: 4.9 10*6/MM3 (ref 3.77–5.28)
SODIUM SERPL-SCNC: 141 MMOL/L (ref 136–145)
TRIGL SERPL-MCNC: 146 MG/DL (ref 0–150)
VLDLC SERPL-MCNC: 25 MG/DL (ref 5–40)
WBC NRBC COR # BLD: 11.46 10*3/MM3 (ref 3.4–10.8)

## 2022-10-10 PROCEDURE — 93005 ELECTROCARDIOGRAM TRACING: CPT | Performed by: EMERGENCY MEDICINE

## 2022-10-10 PROCEDURE — 85610 PROTHROMBIN TIME: CPT

## 2022-10-10 PROCEDURE — 99211 OFF/OP EST MAY X REQ PHY/QHP: CPT

## 2022-10-10 PROCEDURE — 93005 ELECTROCARDIOGRAM TRACING: CPT | Performed by: INTERNAL MEDICINE

## 2022-10-10 PROCEDURE — 71046 X-RAY EXAM CHEST 2 VIEWS: CPT

## 2022-10-10 PROCEDURE — 80048 BASIC METABOLIC PNL TOTAL CA: CPT

## 2022-10-10 PROCEDURE — 93005 ELECTROCARDIOGRAM TRACING: CPT

## 2022-10-10 PROCEDURE — 85027 COMPLETE CBC AUTOMATED: CPT

## 2022-10-10 PROCEDURE — 93010 ELECTROCARDIOGRAM REPORT: CPT | Performed by: INTERNAL MEDICINE

## 2022-10-10 PROCEDURE — 99214 OFFICE O/P EST MOD 30 MIN: CPT | Performed by: INTERNAL MEDICINE

## 2022-10-10 PROCEDURE — 36415 COLL VENOUS BLD VENIPUNCTURE: CPT

## 2022-10-10 PROCEDURE — 80061 LIPID PANEL: CPT

## 2022-10-10 NOTE — H&P (VIEW-ONLY)
Encounter Date:10/10/2022  Last seen 10/5/2022      Patient ID: Raya May is a 72 y.o. female.    Chief Complaint:  Status post pacemaker  Hypertension  Diabetes  Dyslipidemia        History of Present Illness  Having some left arm discomfort chest discomfort and shortness of breath similar to what she had prior to pacemaker placement.  Since last seen 10/5/2022 patient has these symptoms with exertion and not much at rest.  Patient went to emergency room at [facility] but did not wait to be seen and decided to come to the office.     Since I have last seen, the patient has been without any palpitations, dizziness or syncope.  Denies having any headache ,abdominal pain ,nausea, vomiting , diarrhea constipation, loss of weight or loss of appetite.  Denies having any excessive bruising ,hematuria or blood in the stool.     Review of all systems negative except as indicated.     Reviewed ROS.  Assessment and Plan      [[[[[[[[[[[[[[[[[[[[[[[[[[    Impression  ==========  -Chest discomfort- suggestive of angina pectoris.    Cardiac catheterization 12/14/2016 revealed normal coronary arteries and normal left ventricular function.  Echocardiogram-normal 4/6/2022.  Lexiscan Cardiolite test-normal 4/6/2022       -Status post permanent dual-chamber pacemaker implantation ([employer] performed in  [place], [place] ).  2010  Battery status is 18 months.      -Hypertension diabetes dyslipidemia obstructive sleep apnea and COPD      -strong family history of coronary artery disease     -history of pulmonary embolus and DVT in the past.     -Exogenous obesity     - status post appendectomy cholecystectomy partial hysterectomy tonsillectomy adenoidectomy carpal tunnel release bilaterally     -Past smoker     -allergy to morphine and morphine hydrocodone     - medical noncompliance in the past.  ================   plan  =============   Status post dual-chamber pacemaker implantation 2010.  Recent interrogation of the  pacemaker revealed battery status of less than 1 month (less than 1 to 7 months)  Pacemaker site looks normal.  Excellent pacing parameters are present.  Follow-up closely for JOHN status.    Chest discomfort or shortness of breath and left arm discomfort suggestive of angina pectoris.  Patient had negative stress Cardiolite test and normal echocardiogram 4/6/2022.  I have discussed the options with her and family regarding diagnostic work-up.  Stress Cardiolite test versus cardiac catheterization.  Patient to have cardiac catheterization and coronary angiography.  Risks and benefits pros and cons of the procedure were discussed with patient and family.  They include infection bleeding blood clot heart attack stroke allergic reaction to the dye renal dysfunction etc.     Hypertension-  142/79    Diabetes-on medications.     Medications were reviewed and updated.     Followup in the office in 3 months with pacemaker interrogation.  (As planned before)  Patient to have remote monitoring closely prior to that.     Further plan will depend on patient's progress.  [[[[[[[[[[[[[[[[[[[[[[[[[[[[[[[                      Diagnosis Plan   1. Status post placement of cardiac pacemaker  Case Request Cath Lab: Left Heart Cath with Coronary Angiography    CBC (No Diff)    Basic Metabolic Panel    Protime-INR    Lipid Panel    ECG 12 Lead    XR Chest 2 View      2. Dyslipidemia  Case Request Cath Lab: Left Heart Cath with Coronary Angiography    CBC (No Diff)    Basic Metabolic Panel    Protime-INR    Lipid Panel    ECG 12 Lead    XR Chest 2 View      3. Essential hypertension  Case Request Cath Lab: Left Heart Cath with Coronary Angiography    CBC (No Diff)    Basic Metabolic Panel    Protime-INR    Lipid Panel    ECG 12 Lead    XR Chest 2 View      4. Bradycardia, sinus  Case Request Cath Lab: Left Heart Cath with Coronary Angiography    CBC (No Diff)    Basic Metabolic Panel    Protime-INR    Lipid Panel    ECG 12 Lead    XR  Chest 2 View      5. Presence of cardiac pacemaker  Case Request Cath Lab: Left Heart Cath with Coronary Angiography    CBC (No Diff)    Basic Metabolic Panel    Protime-INR    Lipid Panel    ECG 12 Lead    XR Chest 2 View      6. Chest discomfort  Case Request Cath Lab: Left Heart Cath with Coronary Angiography    CBC (No Diff)    Basic Metabolic Panel    Protime-INR    Lipid Panel    ECG 12 Lead    XR Chest 2 View      7. Pacemaker  Case Request Cath Lab: Left Heart Cath with Coronary Angiography    CBC (No Diff)    Basic Metabolic Panel    Protime-INR    Lipid Panel    ECG 12 Lead    XR Chest 2 View      LAB RESULTS (LAST 7 DAYS)    CBC        BMP        CMP         BNP        TROPONIN        CoAg        Creatinine Clearance  CrCl cannot be calculated (No successful lab value found.).    ABG        Radiology  No radiology results for the last day                The following portions of the patient's history were reviewed and updated as appropriate: allergies, current medications, past family history, past medical history, past social history, past surgical history and problem list.    Review of Systems   Constitutional: Positive for malaise/fatigue. Negative for fever.   Cardiovascular: Positive for chest pain and leg swelling. Negative for dyspnea on exertion and palpitations.   Respiratory: Positive for shortness of breath. Negative for cough.    Skin: Negative for rash.   Gastrointestinal: Positive for nausea. Negative for abdominal pain and vomiting.   Neurological: Positive for dizziness, light-headedness, numbness and weakness. Negative for focal weakness and headaches.   All other systems reviewed and are negative.        Current Outpatient Medications:   •  albuterol (PROVENTIL) (2.5 MG/3ML) 0.083% nebulizer solution, albuterol sulfate 2.5 mg/3 mL (0.083 %) solution for nebulization, Disp: , Rfl:   •  albuterol sulfate  (90 Base) MCG/ACT inhaler, albuterol sulfate HFA 90 mcg/actuation aerosol  inhaler, Disp: , Rfl:   •  amitriptyline (ELAVIL) 25 MG tablet, amitriptyline 25 mg tablet, Disp: , Rfl:   •  amLODIPine (NORVASC) 10 MG tablet, amlodipine 10 mg tablet  TAKE ONE TABLET BY MOUTH EVERY DAY, Disp: , Rfl:   •  ARIPiprazole (ABILIFY) 10 MG tablet, aripiprazole 10 mg tablet  TAKE ONE TABLET BY MOUTH EVERY DAY, Disp: , Rfl:   •  aspirin 81 MG EC tablet, aspirin 81 mg tablet,delayed release  Take 1 tablet every day by oral route., Disp: , Rfl:   •  azithromycin (ZITHROMAX) 250 MG tablet, azithromycin 250 mg tablet, Disp: , Rfl:   •  benzonatate (TESSALON) 200 MG capsule, Every 8 (Eight) Hours., Disp: , Rfl:   •  citalopram (CeleXA) 40 MG tablet, citalopram 40 mg tablet, Disp: , Rfl:   •  clotrimazole (LOTRIMIN) 1 % cream, clotrimazole 1 % topical cream, Disp: , Rfl:   •  cyclobenzaprine (FLEXERIL) 10 MG tablet, cyclobenzaprine 10 mg tablet  TAKE ONE TABLET BY MOUTH THREE TIMES DAILY AS NEEDED FOR 10 DAYS, Disp: , Rfl:   •  dexlansoprazole (DEXILANT) 60 MG capsule, Dexilant 60 mg capsule, delayed release  TAKE 1 CAPSULE BY MOUTH ONCE DAILY, Disp: , Rfl:   •  diclofenac (VOLTAREN) 50 MG EC tablet, diclofenac sodium 50 mg tablet,delayed release  TAKE ONE TABLET BY MOUTH TWICE DAILY AS NEEDED, Disp: , Rfl:   •  docusate sodium 100 MG capsule, docusate sodium 100 mg capsule  Take 1 capsule every day by oral route as needed for 30 days., Disp: , Rfl:   •  fluconazole (DIFLUCAN) 150 MG tablet, fluconazole 150 mg tablet, Disp: , Rfl:   •  furosemide (LASIX) 20 MG tablet, Lasix 20 mg tablet  Take 1 tablet every day by oral route as directed for 30 days., Disp: , Rfl:   •  gabapentin (NEURONTIN) 300 MG capsule, gabapentin 300 mg capsule  TAKE ONE CAPSULE BY MOUTH THREE TIMES DAILY, Disp: , Rfl:   •  glyburide (DIAbeta) 5 MG tablet, glyburide 5 mg tablet, Disp: , Rfl:   •  guaiFENesin-codeine (GUAIFENESIN AC) 100-10 MG/5ML liquid, 10 mL., Disp: , Rfl:   •  hydroCHLOROthiazide (HYDRODIURIL) 50 MG tablet,  hydrochlorothiazide 50 mg tablet  Take 1 tablet every day by oral route., Disp: , Rfl:   •  HYDROcodone-acetaminophen (NORCO) 5-325 MG per tablet, Take 1 tablet by mouth Every 12 (Twelve) Hours As Needed., Disp: , Rfl:   •  isosorbide mononitrate (IMDUR) 30 MG 24 hr tablet, , Disp: , Rfl:   •  levoFLOXacin (LEVAQUIN) 500 MG tablet, levofloxacin 500 mg tablet, Disp: , Rfl:   •  lisinopril (PRINIVIL,ZESTRIL) 40 MG tablet, lisinopril 40 mg tablet  TAKE 1 TABLET EVERY DAY, Disp: , Rfl:   •  loratadine (CLARITIN) 10 MG tablet, loratadine 10 mg tablet  TAKE ONE TABLET BY MOUTH EVERY DAY, Disp: , Rfl:   •  losartan (COZAAR) 25 MG tablet, losartan 25 mg tablet, Disp: , Rfl:   •  Lumateperone Tosylate (Caplyta) 42 MG capsule, Caplyta 42 mg capsule, Disp: , Rfl:   •  lurasidone (LATUDA) 40 MG tablet tablet, Latuda 40 mg tablet  TAKE ONE TABLET BY MOUTH ONCE DAILY, Disp: , Rfl:   •  montelukast (SINGULAIR) 10 MG tablet, montelukast 10 mg tablet  TAKE ONE TABLET BY MOUTH EVERY DAY AS DIRECTED, Disp: , Rfl:   •  nitroglycerin (NITROSTAT) 0.4 MG SL tablet, nitroglycerin 0.4 mg sublingual tablet  DISSOLVE ONE TABLET UNDER THE TONGUE EVERY 5 MINUTES AS NEEDED FOR CHEST PAIN, Disp: , Rfl:   •  omeprazole (priLOSEC) 40 MG capsule, omeprazole 40 mg capsule,delayed release  TAKE ONE CAPSULE BY MOUTH EVERY DAY, Disp: , Rfl:   •  ondansetron ODT (ZOFRAN-ODT) 8 MG disintegrating tablet, ondansetron 8 mg disintegrating tablet  DISSOLVE ONE TABLET UNDER THE TONGUE TWICE DAILY AS NEEDED, Disp: , Rfl:   •  polyethylene glycol (MIRALAX) 17 GM/SCOOP powder, 17 g., Disp: , Rfl:   •  potassium chloride (K-DUR,KLOR-CON) 20 MEQ CR tablet, potassium chloride ER 20 mEq tablet,extended release(part/cryst)  TAKE ONE TABLET BY MOUTH EVERY DAY, Disp: , Rfl:   •  rOPINIRole (REQUIP) 0.25 MG tablet, Requip 0.25 mg tablet  Start 1 tablet by mouth nightly x 2 days, then increase to 2 tablets nightly x 5 days, then increase to 4 tablets nightly., Disp: , Rfl:    •  traMADol (ULTRAM) 50 MG tablet, tramadol 50 mg tablet  TAKE 1 TABLET BY MOUTH THREE TIMES DAILY AS NEEDED, Disp: , Rfl:   •  traZODone (DESYREL) 50 MG tablet, trazodone 50 mg tablet  TAKE ONE TABLET BY MOUTH AT BEDTIME AS NEEDED, Disp: , Rfl:   •  Tresiba FlexTouch 100 UNIT/ML solution pen-injector injection, INJECT 20 UNITS SUBCUTANEOUSLY EVERY DAY, Disp: , Rfl:     Allergies   Allergen Reactions   • Hydrocodone-Acetaminophen Rash   • Morphine Rash   • Penicillins Rash       Family History   Problem Relation Age of Onset   • Arthritis Mother    • Hypertension Mother    • Heart disease Mother    • Cancer Mother    • Diabetes Mother    • Gout Father    • Heart failure Father    • Heart disease Father    • Diabetes Father        Past Surgical History:   Procedure Laterality Date   • APPENDECTOMY     • CHOLECYSTECTOMY     • DORSAL COMPARTMENT RELEASE     • INCONTINENCE SURGERY     • INSERT / REPLACE / REMOVE PACEMAKER     • TONSILLECTOMY AND ADENOIDECTOMY         Past Medical History:   Diagnosis Date   • Arthritis    • Asthma    • Bronchitis    • Claustrophobia    • Coronary artery disease    • Diabetes mellitus (HCC)    • Emphysema/COPD (HCC)    • Heart failure (HCC)    • History of back pain    • History of bladder infections    • Hypertension    • Myocardial infarction (HCC)    • Osteoporosis    • Pneumonia    • Stroke (HCC)        Family History   Problem Relation Age of Onset   • Arthritis Mother    • Hypertension Mother    • Heart disease Mother    • Cancer Mother    • Diabetes Mother    • Gout Father    • Heart failure Father    • Heart disease Father    • Diabetes Father        Social History     Socioeconomic History   • Marital status:    Tobacco Use   • Smoking status: Every Day     Packs/day: 0.25     Types: Cigarettes   • Smokeless tobacco: Never   Vaping Use   • Vaping Use: Never used   Substance and Sexual Activity   • Alcohol use: Yes   • Drug use: Never   • Sexual activity: Defer  "        Procedures      Objective:       Physical Exam    /79 (BP Location: Right arm, Patient Position: Sitting, Cuff Size: Adult)   Pulse 83   Ht 160 cm (63\")   Wt 114 kg (250 lb 9.6 oz)   SpO2 95%   BMI 44.39 kg/m²   The patient is alert, oriented and in no distress.    Vital signs as noted above.  Exogenous obesity (BMI 44)    Head and neck revealed no carotid bruits or jugular venous distension.  No thyromegaly or lymphadenopathy is present.    Lungs clear.  No wheezing.  Breath sounds are normal bilaterally.    Heart normal first and second heart sounds.  No murmur..  No pericardial rub is present.  No gallop is present.    Abdomen soft and nontender.  No organomegaly is present.    Extremities revealed good peripheral pulses without any pedal edema.    Skin warm and dry.    Musculoskeletal system is grossly normal.    CNS grossly normal.    Reviewed and updated.        "

## 2022-10-10 NOTE — TELEPHONE ENCOUNTER
Caller: Raya May    Relationship to patient: Self    Best call back number: [phone]     Patient is needing: HAVING ACTIVE CHEST PAINS-BREAKING OUT IN SWEATS AND HURTS TO BREATHE-TRANSFERRED TO [facility]

## 2022-10-10 NOTE — H&P (VIEW-ONLY)
Encounter Date:10/10/2022  Last seen 10/5/2022      Patient ID: Raya May is a 72 y.o. female.    Chief Complaint:  Status post pacemaker  Hypertension  Diabetes  Dyslipidemia        History of Present Illness  Having some left arm discomfort chest discomfort and shortness of breath similar to what she had prior to pacemaker placement.  Since last seen 10/5/2022 patient has these symptoms with exertion and not much at rest.  Patient went to emergency room at [facility] but did not wait to be seen and decided to come to the office.     Since I have last seen, the patient has been without any palpitations, dizziness or syncope.  Denies having any headache ,abdominal pain ,nausea, vomiting , diarrhea constipation, loss of weight or loss of appetite.  Denies having any excessive bruising ,hematuria or blood in the stool.     Review of all systems negative except as indicated.     Reviewed ROS.  Assessment and Plan      [[[[[[[[[[[[[[[[[[[[[[[[[[    Impression  ==========  -Chest discomfort- suggestive of angina pectoris.    Cardiac catheterization 12/14/2016 revealed normal coronary arteries and normal left ventricular function.  Echocardiogram-normal 4/6/2022.  Lexiscan Cardiolite test-normal 4/6/2022       -Status post permanent dual-chamber pacemaker implantation ([employer] performed in  [place], [place] ).  2010  Battery status is 18 months.      -Hypertension diabetes dyslipidemia obstructive sleep apnea and COPD      -strong family history of coronary artery disease     -history of pulmonary embolus and DVT in the past.     -Exogenous obesity     - status post appendectomy cholecystectomy partial hysterectomy tonsillectomy adenoidectomy carpal tunnel release bilaterally     -Past smoker     -allergy to morphine and morphine hydrocodone     - medical noncompliance in the past.  ================   plan  =============   Status post dual-chamber pacemaker implantation 2010.  Recent interrogation of the  pacemaker revealed battery status of less than 1 month (less than 1 to 7 months)  Pacemaker site looks normal.  Excellent pacing parameters are present.  Follow-up closely for JOHN status.    Chest discomfort or shortness of breath and left arm discomfort suggestive of angina pectoris.  Patient had negative stress Cardiolite test and normal echocardiogram 4/6/2022.  I have discussed the options with her and family regarding diagnostic work-up.  Stress Cardiolite test versus cardiac catheterization.  Patient to have cardiac catheterization and coronary angiography.  Risks and benefits pros and cons of the procedure were discussed with patient and family.  They include infection bleeding blood clot heart attack stroke allergic reaction to the dye renal dysfunction etc.     Hypertension-  142/79    Diabetes-on medications.     Medications were reviewed and updated.     Followup in the office in 3 months with pacemaker interrogation.  (As planned before)  Patient to have remote monitoring closely prior to that.     Further plan will depend on patient's progress.  [[[[[[[[[[[[[[[[[[[[[[[[[[[[[[[                      Diagnosis Plan   1. Status post placement of cardiac pacemaker  Case Request Cath Lab: Left Heart Cath with Coronary Angiography    CBC (No Diff)    Basic Metabolic Panel    Protime-INR    Lipid Panel    ECG 12 Lead    XR Chest 2 View      2. Dyslipidemia  Case Request Cath Lab: Left Heart Cath with Coronary Angiography    CBC (No Diff)    Basic Metabolic Panel    Protime-INR    Lipid Panel    ECG 12 Lead    XR Chest 2 View      3. Essential hypertension  Case Request Cath Lab: Left Heart Cath with Coronary Angiography    CBC (No Diff)    Basic Metabolic Panel    Protime-INR    Lipid Panel    ECG 12 Lead    XR Chest 2 View      4. Bradycardia, sinus  Case Request Cath Lab: Left Heart Cath with Coronary Angiography    CBC (No Diff)    Basic Metabolic Panel    Protime-INR    Lipid Panel    ECG 12 Lead    XR  Chest 2 View      5. Presence of cardiac pacemaker  Case Request Cath Lab: Left Heart Cath with Coronary Angiography    CBC (No Diff)    Basic Metabolic Panel    Protime-INR    Lipid Panel    ECG 12 Lead    XR Chest 2 View      6. Chest discomfort  Case Request Cath Lab: Left Heart Cath with Coronary Angiography    CBC (No Diff)    Basic Metabolic Panel    Protime-INR    Lipid Panel    ECG 12 Lead    XR Chest 2 View      7. Pacemaker  Case Request Cath Lab: Left Heart Cath with Coronary Angiography    CBC (No Diff)    Basic Metabolic Panel    Protime-INR    Lipid Panel    ECG 12 Lead    XR Chest 2 View      LAB RESULTS (LAST 7 DAYS)    CBC        BMP        CMP         BNP        TROPONIN        CoAg        Creatinine Clearance  CrCl cannot be calculated (No successful lab value found.).    ABG        Radiology  No radiology results for the last day                The following portions of the patient's history were reviewed and updated as appropriate: allergies, current medications, past family history, past medical history, past social history, past surgical history and problem list.    Review of Systems   Constitutional: Positive for malaise/fatigue. Negative for fever.   Cardiovascular: Positive for chest pain and leg swelling. Negative for dyspnea on exertion and palpitations.   Respiratory: Positive for shortness of breath. Negative for cough.    Skin: Negative for rash.   Gastrointestinal: Positive for nausea. Negative for abdominal pain and vomiting.   Neurological: Positive for dizziness, light-headedness, numbness and weakness. Negative for focal weakness and headaches.   All other systems reviewed and are negative.        Current Outpatient Medications:   •  albuterol (PROVENTIL) (2.5 MG/3ML) 0.083% nebulizer solution, albuterol sulfate 2.5 mg/3 mL (0.083 %) solution for nebulization, Disp: , Rfl:   •  albuterol sulfate  (90 Base) MCG/ACT inhaler, albuterol sulfate HFA 90 mcg/actuation aerosol  inhaler, Disp: , Rfl:   •  amitriptyline (ELAVIL) 25 MG tablet, amitriptyline 25 mg tablet, Disp: , Rfl:   •  amLODIPine (NORVASC) 10 MG tablet, amlodipine 10 mg tablet  TAKE ONE TABLET BY MOUTH EVERY DAY, Disp: , Rfl:   •  ARIPiprazole (ABILIFY) 10 MG tablet, aripiprazole 10 mg tablet  TAKE ONE TABLET BY MOUTH EVERY DAY, Disp: , Rfl:   •  aspirin 81 MG EC tablet, aspirin 81 mg tablet,delayed release  Take 1 tablet every day by oral route., Disp: , Rfl:   •  azithromycin (ZITHROMAX) 250 MG tablet, azithromycin 250 mg tablet, Disp: , Rfl:   •  benzonatate (TESSALON) 200 MG capsule, Every 8 (Eight) Hours., Disp: , Rfl:   •  citalopram (CeleXA) 40 MG tablet, citalopram 40 mg tablet, Disp: , Rfl:   •  clotrimazole (LOTRIMIN) 1 % cream, clotrimazole 1 % topical cream, Disp: , Rfl:   •  cyclobenzaprine (FLEXERIL) 10 MG tablet, cyclobenzaprine 10 mg tablet  TAKE ONE TABLET BY MOUTH THREE TIMES DAILY AS NEEDED FOR 10 DAYS, Disp: , Rfl:   •  dexlansoprazole (DEXILANT) 60 MG capsule, Dexilant 60 mg capsule, delayed release  TAKE 1 CAPSULE BY MOUTH ONCE DAILY, Disp: , Rfl:   •  diclofenac (VOLTAREN) 50 MG EC tablet, diclofenac sodium 50 mg tablet,delayed release  TAKE ONE TABLET BY MOUTH TWICE DAILY AS NEEDED, Disp: , Rfl:   •  docusate sodium 100 MG capsule, docusate sodium 100 mg capsule  Take 1 capsule every day by oral route as needed for 30 days., Disp: , Rfl:   •  fluconazole (DIFLUCAN) 150 MG tablet, fluconazole 150 mg tablet, Disp: , Rfl:   •  furosemide (LASIX) 20 MG tablet, Lasix 20 mg tablet  Take 1 tablet every day by oral route as directed for 30 days., Disp: , Rfl:   •  gabapentin (NEURONTIN) 300 MG capsule, gabapentin 300 mg capsule  TAKE ONE CAPSULE BY MOUTH THREE TIMES DAILY, Disp: , Rfl:   •  glyburide (DIAbeta) 5 MG tablet, glyburide 5 mg tablet, Disp: , Rfl:   •  guaiFENesin-codeine (GUAIFENESIN AC) 100-10 MG/5ML liquid, 10 mL., Disp: , Rfl:   •  hydroCHLOROthiazide (HYDRODIURIL) 50 MG tablet,  hydrochlorothiazide 50 mg tablet  Take 1 tablet every day by oral route., Disp: , Rfl:   •  HYDROcodone-acetaminophen (NORCO) 5-325 MG per tablet, Take 1 tablet by mouth Every 12 (Twelve) Hours As Needed., Disp: , Rfl:   •  isosorbide mononitrate (IMDUR) 30 MG 24 hr tablet, , Disp: , Rfl:   •  levoFLOXacin (LEVAQUIN) 500 MG tablet, levofloxacin 500 mg tablet, Disp: , Rfl:   •  lisinopril (PRINIVIL,ZESTRIL) 40 MG tablet, lisinopril 40 mg tablet  TAKE 1 TABLET EVERY DAY, Disp: , Rfl:   •  loratadine (CLARITIN) 10 MG tablet, loratadine 10 mg tablet  TAKE ONE TABLET BY MOUTH EVERY DAY, Disp: , Rfl:   •  losartan (COZAAR) 25 MG tablet, losartan 25 mg tablet, Disp: , Rfl:   •  Lumateperone Tosylate (Caplyta) 42 MG capsule, Caplyta 42 mg capsule, Disp: , Rfl:   •  lurasidone (LATUDA) 40 MG tablet tablet, Latuda 40 mg tablet  TAKE ONE TABLET BY MOUTH ONCE DAILY, Disp: , Rfl:   •  montelukast (SINGULAIR) 10 MG tablet, montelukast 10 mg tablet  TAKE ONE TABLET BY MOUTH EVERY DAY AS DIRECTED, Disp: , Rfl:   •  nitroglycerin (NITROSTAT) 0.4 MG SL tablet, nitroglycerin 0.4 mg sublingual tablet  DISSOLVE ONE TABLET UNDER THE TONGUE EVERY 5 MINUTES AS NEEDED FOR CHEST PAIN, Disp: , Rfl:   •  omeprazole (priLOSEC) 40 MG capsule, omeprazole 40 mg capsule,delayed release  TAKE ONE CAPSULE BY MOUTH EVERY DAY, Disp: , Rfl:   •  ondansetron ODT (ZOFRAN-ODT) 8 MG disintegrating tablet, ondansetron 8 mg disintegrating tablet  DISSOLVE ONE TABLET UNDER THE TONGUE TWICE DAILY AS NEEDED, Disp: , Rfl:   •  polyethylene glycol (MIRALAX) 17 GM/SCOOP powder, 17 g., Disp: , Rfl:   •  potassium chloride (K-DUR,KLOR-CON) 20 MEQ CR tablet, potassium chloride ER 20 mEq tablet,extended release(part/cryst)  TAKE ONE TABLET BY MOUTH EVERY DAY, Disp: , Rfl:   •  rOPINIRole (REQUIP) 0.25 MG tablet, Requip 0.25 mg tablet  Start 1 tablet by mouth nightly x 2 days, then increase to 2 tablets nightly x 5 days, then increase to 4 tablets nightly., Disp: , Rfl:    •  traMADol (ULTRAM) 50 MG tablet, tramadol 50 mg tablet  TAKE 1 TABLET BY MOUTH THREE TIMES DAILY AS NEEDED, Disp: , Rfl:   •  traZODone (DESYREL) 50 MG tablet, trazodone 50 mg tablet  TAKE ONE TABLET BY MOUTH AT BEDTIME AS NEEDED, Disp: , Rfl:   •  Tresiba FlexTouch 100 UNIT/ML solution pen-injector injection, INJECT 20 UNITS SUBCUTANEOUSLY EVERY DAY, Disp: , Rfl:     Allergies   Allergen Reactions   • Hydrocodone-Acetaminophen Rash   • Morphine Rash   • Penicillins Rash       Family History   Problem Relation Age of Onset   • Arthritis Mother    • Hypertension Mother    • Heart disease Mother    • Cancer Mother    • Diabetes Mother    • Gout Father    • Heart failure Father    • Heart disease Father    • Diabetes Father        Past Surgical History:   Procedure Laterality Date   • APPENDECTOMY     • CHOLECYSTECTOMY     • DORSAL COMPARTMENT RELEASE     • INCONTINENCE SURGERY     • INSERT / REPLACE / REMOVE PACEMAKER     • TONSILLECTOMY AND ADENOIDECTOMY         Past Medical History:   Diagnosis Date   • Arthritis    • Asthma    • Bronchitis    • Claustrophobia    • Coronary artery disease    • Diabetes mellitus (HCC)    • Emphysema/COPD (HCC)    • Heart failure (HCC)    • History of back pain    • History of bladder infections    • Hypertension    • Myocardial infarction (HCC)    • Osteoporosis    • Pneumonia    • Stroke (HCC)        Family History   Problem Relation Age of Onset   • Arthritis Mother    • Hypertension Mother    • Heart disease Mother    • Cancer Mother    • Diabetes Mother    • Gout Father    • Heart failure Father    • Heart disease Father    • Diabetes Father        Social History     Socioeconomic History   • Marital status:    Tobacco Use   • Smoking status: Every Day     Packs/day: 0.25     Types: Cigarettes   • Smokeless tobacco: Never   Vaping Use   • Vaping Use: Never used   Substance and Sexual Activity   • Alcohol use: Yes   • Drug use: Never   • Sexual activity: Defer  "        Procedures      Objective:       Physical Exam    /79 (BP Location: Right arm, Patient Position: Sitting, Cuff Size: Adult)   Pulse 83   Ht 160 cm (63\")   Wt 114 kg (250 lb 9.6 oz)   SpO2 95%   BMI 44.39 kg/m²   The patient is alert, oriented and in no distress.    Vital signs as noted above.  Exogenous obesity (BMI 44)    Head and neck revealed no carotid bruits or jugular venous distension.  No thyromegaly or lymphadenopathy is present.    Lungs clear.  No wheezing.  Breath sounds are normal bilaterally.    Heart normal first and second heart sounds.  No murmur..  No pericardial rub is present.  No gallop is present.    Abdomen soft and nontender.  No organomegaly is present.    Extremities revealed good peripheral pulses without any pedal edema.    Skin warm and dry.    Musculoskeletal system is grossly normal.    CNS grossly normal.    Reviewed and updated.        "

## 2022-10-10 NOTE — ED NOTES
Pt come to window and stated she was able to get a hold of Dr. Posey and to come in there to be seen, pt left ambulatory out door.

## 2022-10-10 NOTE — TELEPHONE ENCOUNTER
HUB TO SHARE     Called patient on all 3 numbers listed. Left VM to call back. Can come into office today at first available appt

## 2022-10-10 NOTE — PROGRESS NOTES
Encounter Date:10/10/2022  Last seen 10/5/2022      Patient ID: Raya May is a 72 y.o. female.    Chief Complaint:  Status post pacemaker  Hypertension  Diabetes  Dyslipidemia        History of Present Illness  Having some left arm discomfort chest discomfort and shortness of breath similar to what she had prior to pacemaker placement.  Since last seen 10/5/2022 patient has these symptoms with exertion and not much at rest.  Patient went to emergency room at [facility] but did not wait to be seen and decided to come to the office.     Since I have last seen, the patient has been without any palpitations, dizziness or syncope.  Denies having any headache ,abdominal pain ,nausea, vomiting , diarrhea constipation, loss of weight or loss of appetite.  Denies having any excessive bruising ,hematuria or blood in the stool.     Review of all systems negative except as indicated.     Reviewed ROS.  Assessment and Plan      [[[[[[[[[[[[[[[[[[[[[[[[[[    Impression  ==========  -Chest discomfort- suggestive of angina pectoris.    Cardiac catheterization 12/14/2016 revealed normal coronary arteries and normal left ventricular function.  Echocardiogram-normal 4/6/2022.  Lexiscan Cardiolite test-normal 4/6/2022       -Status post permanent dual-chamber pacemaker implantation ([employer] performed in  [place], [place] ).  2010  Battery status is 18 months.      -Hypertension diabetes dyslipidemia obstructive sleep apnea and COPD      -strong family history of coronary artery disease     -history of pulmonary embolus and DVT in the past.     -Exogenous obesity     - status post appendectomy cholecystectomy partial hysterectomy tonsillectomy adenoidectomy carpal tunnel release bilaterally     -Past smoker     -allergy to morphine and morphine hydrocodone     - medical noncompliance in the past.  ================   plan  =============   Status post dual-chamber pacemaker implantation 2010.  Recent interrogation of the  pacemaker revealed battery status of less than 1 month (less than 1 to 7 months)  Pacemaker site looks normal.  Excellent pacing parameters are present.  Follow-up closely for JOHN status.    Chest discomfort or shortness of breath and left arm discomfort suggestive of angina pectoris.  Patient had negative stress Cardiolite test and normal echocardiogram 4/6/2022.  I have discussed the options with her and family regarding diagnostic work-up.  Stress Cardiolite test versus cardiac catheterization.  Patient to have cardiac catheterization and coronary angiography.  Risks and benefits pros and cons of the procedure were discussed with patient and family.  They include infection bleeding blood clot heart attack stroke allergic reaction to the dye renal dysfunction etc.     Hypertension-  142/79    Diabetes-on medications.     Medications were reviewed and updated.     Followup in the office in 3 months with pacemaker interrogation.  (As planned before)  Patient to have remote monitoring closely prior to that.     Further plan will depend on patient's progress.  [[[[[[[[[[[[[[[[[[[[[[[[[[[[[[[   ADDENDUM    10/21/2022  Removal and replacement of dual-chamber pacemaker pulse generator (Medtronic MRI compatible).  Placement of antibiotic pouch.  Invasive interrogation of dual-chamber pacemaker.  {{{{{{{{{{{{{{{               Diagnosis Plan   1. Status post placement of cardiac pacemaker  Case Request Cath Lab: Left Heart Cath with Coronary Angiography    CBC (No Diff)    Basic Metabolic Panel    Protime-INR    Lipid Panel    ECG 12 Lead    XR Chest 2 View      2. Dyslipidemia  Case Request Cath Lab: Left Heart Cath with Coronary Angiography    CBC (No Diff)    Basic Metabolic Panel    Protime-INR    Lipid Panel    ECG 12 Lead    XR Chest 2 View      3. Essential hypertension  Case Request Cath Lab: Left Heart Cath with Coronary Angiography    CBC (No Diff)    Basic Metabolic Panel    Protime-INR    Lipid Panel    ECG 12  Lead    XR Chest 2 View      4. Bradycardia, sinus  Case Request Cath Lab: Left Heart Cath with Coronary Angiography    CBC (No Diff)    Basic Metabolic Panel    Protime-INR    Lipid Panel    ECG 12 Lead    XR Chest 2 View      5. Presence of cardiac pacemaker  Case Request Cath Lab: Left Heart Cath with Coronary Angiography    CBC (No Diff)    Basic Metabolic Panel    Protime-INR    Lipid Panel    ECG 12 Lead    XR Chest 2 View      6. Chest discomfort  Case Request Cath Lab: Left Heart Cath with Coronary Angiography    CBC (No Diff)    Basic Metabolic Panel    Protime-INR    Lipid Panel    ECG 12 Lead    XR Chest 2 View      7. Pacemaker  Case Request Cath Lab: Left Heart Cath with Coronary Angiography    CBC (No Diff)    Basic Metabolic Panel    Protime-INR    Lipid Panel    ECG 12 Lead    XR Chest 2 View      LAB RESULTS (LAST 7 DAYS)    CBC        BMP        CMP         BNP        TROPONIN        CoAg        Creatinine Clearance  CrCl cannot be calculated (No successful lab value found.).    ABG        Radiology  No radiology results for the last day                The following portions of the patient's history were reviewed and updated as appropriate: allergies, current medications, past family history, past medical history, past social history, past surgical history and problem list.    Review of Systems   Constitutional: Positive for malaise/fatigue. Negative for fever.   Cardiovascular: Positive for chest pain and leg swelling. Negative for dyspnea on exertion and palpitations.   Respiratory: Positive for shortness of breath. Negative for cough.    Skin: Negative for rash.   Gastrointestinal: Positive for nausea. Negative for abdominal pain and vomiting.   Neurological: Positive for dizziness, light-headedness, numbness and weakness. Negative for focal weakness and headaches.   All other systems reviewed and are negative.        Current Outpatient Medications:   •  albuterol (PROVENTIL) (2.5 MG/3ML) 0.083%  nebulizer solution, albuterol sulfate 2.5 mg/3 mL (0.083 %) solution for nebulization, Disp: , Rfl:   •  albuterol sulfate  (90 Base) MCG/ACT inhaler, albuterol sulfate HFA 90 mcg/actuation aerosol inhaler, Disp: , Rfl:   •  amitriptyline (ELAVIL) 25 MG tablet, amitriptyline 25 mg tablet, Disp: , Rfl:   •  amLODIPine (NORVASC) 10 MG tablet, amlodipine 10 mg tablet  TAKE ONE TABLET BY MOUTH EVERY DAY, Disp: , Rfl:   •  ARIPiprazole (ABILIFY) 10 MG tablet, aripiprazole 10 mg tablet  TAKE ONE TABLET BY MOUTH EVERY DAY, Disp: , Rfl:   •  aspirin 81 MG EC tablet, aspirin 81 mg tablet,delayed release  Take 1 tablet every day by oral route., Disp: , Rfl:   •  azithromycin (ZITHROMAX) 250 MG tablet, azithromycin 250 mg tablet, Disp: , Rfl:   •  benzonatate (TESSALON) 200 MG capsule, Every 8 (Eight) Hours., Disp: , Rfl:   •  citalopram (CeleXA) 40 MG tablet, citalopram 40 mg tablet, Disp: , Rfl:   •  clotrimazole (LOTRIMIN) 1 % cream, clotrimazole 1 % topical cream, Disp: , Rfl:   •  cyclobenzaprine (FLEXERIL) 10 MG tablet, cyclobenzaprine 10 mg tablet  TAKE ONE TABLET BY MOUTH THREE TIMES DAILY AS NEEDED FOR 10 DAYS, Disp: , Rfl:   •  dexlansoprazole (DEXILANT) 60 MG capsule, Dexilant 60 mg capsule, delayed release  TAKE 1 CAPSULE BY MOUTH ONCE DAILY, Disp: , Rfl:   •  diclofenac (VOLTAREN) 50 MG EC tablet, diclofenac sodium 50 mg tablet,delayed release  TAKE ONE TABLET BY MOUTH TWICE DAILY AS NEEDED, Disp: , Rfl:   •  docusate sodium 100 MG capsule, docusate sodium 100 mg capsule  Take 1 capsule every day by oral route as needed for 30 days., Disp: , Rfl:   •  fluconazole (DIFLUCAN) 150 MG tablet, fluconazole 150 mg tablet, Disp: , Rfl:   •  furosemide (LASIX) 20 MG tablet, Lasix 20 mg tablet  Take 1 tablet every day by oral route as directed for 30 days., Disp: , Rfl:   •  gabapentin (NEURONTIN) 300 MG capsule, gabapentin 300 mg capsule  TAKE ONE CAPSULE BY MOUTH THREE TIMES DAILY, Disp: , Rfl:   •  glyburide  (DIAbeta) 5 MG tablet, glyburide 5 mg tablet, Disp: , Rfl:   •  guaiFENesin-codeine (GUAIFENESIN AC) 100-10 MG/5ML liquid, 10 mL., Disp: , Rfl:   •  hydroCHLOROthiazide (HYDRODIURIL) 50 MG tablet, hydrochlorothiazide 50 mg tablet  Take 1 tablet every day by oral route., Disp: , Rfl:   •  HYDROcodone-acetaminophen (NORCO) 5-325 MG per tablet, Take 1 tablet by mouth Every 12 (Twelve) Hours As Needed., Disp: , Rfl:   •  isosorbide mononitrate (IMDUR) 30 MG 24 hr tablet, , Disp: , Rfl:   •  levoFLOXacin (LEVAQUIN) 500 MG tablet, levofloxacin 500 mg tablet, Disp: , Rfl:   •  lisinopril (PRINIVIL,ZESTRIL) 40 MG tablet, lisinopril 40 mg tablet  TAKE 1 TABLET EVERY DAY, Disp: , Rfl:   •  loratadine (CLARITIN) 10 MG tablet, loratadine 10 mg tablet  TAKE ONE TABLET BY MOUTH EVERY DAY, Disp: , Rfl:   •  losartan (COZAAR) 25 MG tablet, losartan 25 mg tablet, Disp: , Rfl:   •  Lumateperone Tosylate (Caplyta) 42 MG capsule, Caplyta 42 mg capsule, Disp: , Rfl:   •  lurasidone (LATUDA) 40 MG tablet tablet, Latuda 40 mg tablet  TAKE ONE TABLET BY MOUTH ONCE DAILY, Disp: , Rfl:   •  montelukast (SINGULAIR) 10 MG tablet, montelukast 10 mg tablet  TAKE ONE TABLET BY MOUTH EVERY DAY AS DIRECTED, Disp: , Rfl:   •  nitroglycerin (NITROSTAT) 0.4 MG SL tablet, nitroglycerin 0.4 mg sublingual tablet  DISSOLVE ONE TABLET UNDER THE TONGUE EVERY 5 MINUTES AS NEEDED FOR CHEST PAIN, Disp: , Rfl:   •  omeprazole (priLOSEC) 40 MG capsule, omeprazole 40 mg capsule,delayed release  TAKE ONE CAPSULE BY MOUTH EVERY DAY, Disp: , Rfl:   •  ondansetron ODT (ZOFRAN-ODT) 8 MG disintegrating tablet, ondansetron 8 mg disintegrating tablet  DISSOLVE ONE TABLET UNDER THE TONGUE TWICE DAILY AS NEEDED, Disp: , Rfl:   •  polyethylene glycol (MIRALAX) 17 GM/SCOOP powder, 17 g., Disp: , Rfl:   •  potassium chloride (K-DUR,KLOR-CON) 20 MEQ CR tablet, potassium chloride ER 20 mEq tablet,extended release(part/cryst)  TAKE ONE TABLET BY MOUTH EVERY DAY, Disp: , Rfl:   •   rOPINIRole (REQUIP) 0.25 MG tablet, Requip 0.25 mg tablet  Start 1 tablet by mouth nightly x 2 days, then increase to 2 tablets nightly x 5 days, then increase to 4 tablets nightly., Disp: , Rfl:   •  traMADol (ULTRAM) 50 MG tablet, tramadol 50 mg tablet  TAKE 1 TABLET BY MOUTH THREE TIMES DAILY AS NEEDED, Disp: , Rfl:   •  traZODone (DESYREL) 50 MG tablet, trazodone 50 mg tablet  TAKE ONE TABLET BY MOUTH AT BEDTIME AS NEEDED, Disp: , Rfl:   •  Tresiba FlexTouch 100 UNIT/ML solution pen-injector injection, INJECT 20 UNITS SUBCUTANEOUSLY EVERY DAY, Disp: , Rfl:     Allergies   Allergen Reactions   • Hydrocodone-Acetaminophen Rash   • Morphine Rash   • Penicillins Rash       Family History   Problem Relation Age of Onset   • Arthritis Mother    • Hypertension Mother    • Heart disease Mother    • Cancer Mother    • Diabetes Mother    • Gout Father    • Heart failure Father    • Heart disease Father    • Diabetes Father        Past Surgical History:   Procedure Laterality Date   • APPENDECTOMY     • CHOLECYSTECTOMY     • DORSAL COMPARTMENT RELEASE     • INCONTINENCE SURGERY     • INSERT / REPLACE / REMOVE PACEMAKER     • TONSILLECTOMY AND ADENOIDECTOMY         Past Medical History:   Diagnosis Date   • Arthritis    • Asthma    • Bronchitis    • Claustrophobia    • Coronary artery disease    • Diabetes mellitus (HCC)    • Emphysema/COPD (HCC)    • Heart failure (HCC)    • History of back pain    • History of bladder infections    • Hypertension    • Myocardial infarction (HCC)    • Osteoporosis    • Pneumonia    • Stroke (HCC)        Family History   Problem Relation Age of Onset   • Arthritis Mother    • Hypertension Mother    • Heart disease Mother    • Cancer Mother    • Diabetes Mother    • Gout Father    • Heart failure Father    • Heart disease Father    • Diabetes Father        Social History     Socioeconomic History   • Marital status:    Tobacco Use   • Smoking status: Every Day     Packs/day: 0.25      "Types: Cigarettes   • Smokeless tobacco: Never   Vaping Use   • Vaping Use: Never used   Substance and Sexual Activity   • Alcohol use: Yes   • Drug use: Never   • Sexual activity: Defer         Procedures      Objective:       Physical Exam    /79 (BP Location: Right arm, Patient Position: Sitting, Cuff Size: Adult)   Pulse 83   Ht 160 cm (63\")   Wt 114 kg (250 lb 9.6 oz)   SpO2 95%   BMI 44.39 kg/m²   The patient is alert, oriented and in no distress.    Vital signs as noted above.  Exogenous obesity (BMI 44)    Head and neck revealed no carotid bruits or jugular venous distension.  No thyromegaly or lymphadenopathy is present.    Lungs clear.  No wheezing.  Breath sounds are normal bilaterally.    Heart normal first and second heart sounds.  No murmur..  No pericardial rub is present.  No gallop is present.    Abdomen soft and nontender.  No organomegaly is present.    Extremities revealed good peripheral pulses without any pedal edema.    Skin warm and dry.    Musculoskeletal system is grossly normal.    CNS grossly normal.    Reviewed and updated.        "

## 2022-10-10 NOTE — TELEPHONE ENCOUNTER
"HAWA, has been having chest pain for 4 days, on and off,  went to ER at out [facility] hospital Saturday, was told to follow up with heart . On Monday, she is having chest pain everyday several times a  day, is having it now, sweating, and has taken nitro and is relieved, comes and goes. Told her to go to ER.   Reason for Disposition  • [1] Chest pain (or \"angina\") comes and goes AND [2] is happening more often (increasing in frequency) or getting worse (increasing in severity) (Exception: chest pains that last only a few seconds)    Additional Information  • Negative: SEVERE difficulty breathing (e.g., struggling for each breath, speaks in single words)  • Negative: Difficult to awaken or acting confused (e.g., disoriented, slurred speech)  • Negative: Shock suspected (e.g., cold/pale/clammy skin, too weak to stand, low BP, rapid pulse)  • Negative: Passed out (i.e., fainted, collapsed and was not responding)  • Negative: [1] Chest pain lasts > 5 minutes AND [2] age > 44  • Negative: [1] Chest pain lasts > 5 minutes AND [2] age > 30 AND [3] one or more cardiac risk factors (e.g., diabetes, high blood pressure, high cholesterol, smoker, or strong family history of heart disease)  • Negative: [1] Chest pain lasts > 5 minutes AND [2] history of heart disease (i.e., angina, heart attack, heart failure, bypass surgery, takes nitroglycerin)  • Negative: [1] Chest pain lasts > 5 minutes AND [2] described as crushing, pressure-like, or heavy  • Negative: Heart beating < 50 beats per minute OR > 140 beats per minute  • Negative: Visible sweat on face or sweat dripping down face  • Negative: Sounds like a life-threatening emergency to the triager  • Negative: Followed a chest injury  • Negative: SEVERE chest pain  • Negative: Pain also in shoulder(s) or arm(s) or jaw (Exception: pain is clearly made worse by movement)  • Negative: Difficulty breathing  • Negative: Dizziness or lightheadedness  • Negative: Coughing up blood  • " "Negative: Cocaine use within last 3 days  • Negative: Major surgery in past month  • Negative: Hip or leg fracture (broken bone) in past month (or had cast on leg or ankle in past month)  • Negative: Illness requiring prolonged bedrest in past month (e.g., immobilization, long hospital stay)  • Negative: Long-distance travel in past month (e.g., car, bus, train, plane; with trip lasting 6 or more hours)  • Negative: History of prior \"blood clot\" in leg or lungs (i.e., deep vein thrombosis, pulmonary embolism)  • Negative: History of inherited increased risk of blood clots (e.g., Factor 5 Leiden, Anti-thrombin 3, Protein C or Protein S deficiency, Prothrombin mutation)  • Negative: Cancer treatment in past six months (or has cancer now)  • Negative: [1] Chest pain lasts > 5 minutes AND [2] occurred in past 3 days (72 hours) (Exception: feels exactly the same as previously diagnosed heartburn and has accompanying sour taste in mouth)    Answer Assessment - Initial Assessment Questions  1. LOCATION: \"Where does it hurt?\"        Mid chest  2. RADIATION: \"Does the pain go anywhere else?\" (e.g., into neck, jaw, arms, back)      Left arm weak, sharp pain  3. ONSET: \"When did the chest pain begin?\" (Minutes, hours or days)       4 days  4. PATTERN \"Does the pain come and go, or has it been constant since it started?\"  \"Does it get worse with exertion?\"       Comes and goes  5. DURATION: \"How long does it last\" (e.g., seconds, minutes, hours)      5 minutes and sometimes longer  6. SEVERITY: \"How bad is the pain?\"  (e.g., Scale 1-10; mild, moderate, or severe)     - MILD (1-3): doesn't interfere with normal activities      - MODERATE (4-7): interferes with normal activities or awakens from sleep     - SEVERE (8-10): excruciating pain, unable to do any normal activities        Rated 7  7. CARDIAC RISK FACTORS: \"Do you have any history of heart problems or risk factors for heart disease?\" (e.g., angina, prior heart attack; " "diabetes, high blood pressure, high cholesterol, smoker, or strong family history of heart disease)      Pacemaker, MI, afib, high blood pressure  8. PULMONARY RISK FACTORS: \"Do you have any history of lung disease?\"  (e.g., blood clots in lung, asthma, emphysema, birth control pills)      COPD, blood clots, on asa  9. CAUSE: \"What do you think is causing the chest pain?\"      heart  10. OTHER SYMPTOMS: \"Do you have any other symptoms?\" (e.g., dizziness, nausea, vomiting, sweating, fever, difficulty breathing, cough)        Sweating, nausea at times, hard to breathe  11. PREGNANCY: \"Is there any chance you are pregnant?\" \"When was your last menstrual period?\"        no    Protocols used: CHEST PAIN-ADULT-AH      "

## 2022-10-12 ENCOUNTER — HOSPITAL ENCOUNTER (OUTPATIENT)
Facility: HOSPITAL | Age: 72
Setting detail: HOSPITAL OUTPATIENT SURGERY
Discharge: HOME | End: 2022-10-12
Attending: INTERNAL MEDICINE | Admitting: INTERNAL MEDICINE

## 2022-10-12 VITALS
BODY MASS INDEX: 43.87 KG/M2 | WEIGHT: 247.58 LBS | SYSTOLIC BLOOD PRESSURE: 100 MMHG | TEMPERATURE: 98.2 F | HEART RATE: 66 BPM | DIASTOLIC BLOOD PRESSURE: 57 MMHG | RESPIRATION RATE: 14 BRPM | HEIGHT: 63 IN | OXYGEN SATURATION: 98 %

## 2022-10-12 DIAGNOSIS — Z95.0 STATUS POST PLACEMENT OF CARDIAC PACEMAKER: ICD-10-CM

## 2022-10-12 DIAGNOSIS — R07.89 CHEST DISCOMFORT: ICD-10-CM

## 2022-10-12 DIAGNOSIS — I10 ESSENTIAL HYPERTENSION: ICD-10-CM

## 2022-10-12 DIAGNOSIS — Z95.0 PRESENCE OF CARDIAC PACEMAKER: Primary | ICD-10-CM

## 2022-10-12 DIAGNOSIS — T82.118A PACEMAKER FAILURE, INITIAL ENCOUNTER: ICD-10-CM

## 2022-10-12 DIAGNOSIS — Z95.0 PACEMAKER: ICD-10-CM

## 2022-10-12 DIAGNOSIS — E78.5 DYSLIPIDEMIA: ICD-10-CM

## 2022-10-12 DIAGNOSIS — R00.1 BRADYCARDIA, SINUS: ICD-10-CM

## 2022-10-12 DIAGNOSIS — Z95.0 PRESENCE OF CARDIAC PACEMAKER: ICD-10-CM

## 2022-10-12 LAB — GLUCOSE BLDC GLUCOMTR-MCNC: 94 MG/DL (ref 70–105)

## 2022-10-12 PROCEDURE — 99152 MOD SED SAME PHYS/QHP 5/>YRS: CPT | Performed by: INTERNAL MEDICINE

## 2022-10-12 PROCEDURE — C1894 INTRO/SHEATH, NON-LASER: HCPCS | Performed by: INTERNAL MEDICINE

## 2022-10-12 PROCEDURE — 93458 L HRT ARTERY/VENTRICLE ANGIO: CPT | Performed by: INTERNAL MEDICINE

## 2022-10-12 PROCEDURE — 93005 ELECTROCARDIOGRAM TRACING: CPT | Performed by: INTERNAL MEDICINE

## 2022-10-12 PROCEDURE — 25010000002 MIDAZOLAM PER 1 MG: Performed by: INTERNAL MEDICINE

## 2022-10-12 PROCEDURE — 25010000002 FENTANYL CITRATE (PF) 100 MCG/2ML SOLUTION: Performed by: INTERNAL MEDICINE

## 2022-10-12 PROCEDURE — C1769 GUIDE WIRE: HCPCS | Performed by: INTERNAL MEDICINE

## 2022-10-12 PROCEDURE — 0 IOPAMIDOL PER 1 ML: Performed by: INTERNAL MEDICINE

## 2022-10-12 PROCEDURE — 82962 GLUCOSE BLOOD TEST: CPT

## 2022-10-12 RX ORDER — FENTANYL CITRATE 50 UG/ML
INJECTION, SOLUTION INTRAMUSCULAR; INTRAVENOUS
Status: DISCONTINUED | OUTPATIENT
Start: 2022-10-12 | End: 2022-10-12 | Stop reason: HOSPADM

## 2022-10-12 RX ORDER — SODIUM CHLORIDE 9 MG/ML
30 INJECTION, SOLUTION INTRAVENOUS CONTINUOUS
Status: DISCONTINUED | OUTPATIENT
Start: 2022-10-12 | End: 2022-10-12 | Stop reason: HOSPADM

## 2022-10-12 RX ORDER — SODIUM CHLORIDE 9 MG/ML
250 INJECTION, SOLUTION INTRAVENOUS ONCE AS NEEDED
Status: DISCONTINUED | OUTPATIENT
Start: 2022-10-12 | End: 2022-10-12 | Stop reason: HOSPADM

## 2022-10-12 RX ORDER — NITROGLYCERIN 0.4 MG/1
0.4 TABLET SUBLINGUAL
Status: DISCONTINUED | OUTPATIENT
Start: 2022-10-12 | End: 2022-10-12 | Stop reason: HOSPADM

## 2022-10-12 RX ORDER — LIDOCAINE HYDROCHLORIDE 20 MG/ML
INJECTION, SOLUTION INFILTRATION; PERINEURAL
Status: DISCONTINUED | OUTPATIENT
Start: 2022-10-12 | End: 2022-10-12 | Stop reason: HOSPADM

## 2022-10-12 RX ORDER — ACETAMINOPHEN 325 MG/1
650 TABLET ORAL EVERY 4 HOURS PRN
Status: DISCONTINUED | OUTPATIENT
Start: 2022-10-12 | End: 2022-10-12 | Stop reason: HOSPADM

## 2022-10-12 RX ORDER — MIDAZOLAM HYDROCHLORIDE 1 MG/ML
INJECTION INTRAMUSCULAR; INTRAVENOUS
Status: DISCONTINUED | OUTPATIENT
Start: 2022-10-12 | End: 2022-10-12 | Stop reason: HOSPADM

## 2022-10-12 RX ORDER — ONDANSETRON 2 MG/ML
4 INJECTION INTRAMUSCULAR; INTRAVENOUS EVERY 6 HOURS PRN
Status: DISCONTINUED | OUTPATIENT
Start: 2022-10-12 | End: 2022-10-12 | Stop reason: HOSPADM

## 2022-10-12 RX ORDER — ONDANSETRON 4 MG/1
4 TABLET, FILM COATED ORAL EVERY 6 HOURS PRN
Status: DISCONTINUED | OUTPATIENT
Start: 2022-10-12 | End: 2022-10-12 | Stop reason: HOSPADM

## 2022-10-12 RX ORDER — DIPHENHYDRAMINE HCL 25 MG
25 CAPSULE ORAL EVERY 6 HOURS PRN
Status: DISCONTINUED | OUTPATIENT
Start: 2022-10-12 | End: 2022-10-12 | Stop reason: HOSPADM

## 2022-10-12 RX ADMIN — NITROGLYCERIN 0.4 MG: 0.4 TABLET SUBLINGUAL at 06:49

## 2022-10-12 RX ADMIN — SODIUM CHLORIDE 30 ML/HR: 9 INJECTION, SOLUTION INTRAVENOUS at 06:47

## 2022-10-12 NOTE — DISCHARGE INSTRUCTIONS
Drink plenty of fluids for the next 24 hours.  This helps to eliminate the dye used in your procedure through urination.  You may resume a normal diet; however, try to avoid foods that would cause gas or constipation.    Sedative medication given to you during your catheterization may decrease your judgement and reaction time for up to 24-48 hours.  Therefore:  DO NOT drive or operate hazardous machinery (48 hours)  DO NOT consume alcoholic beverages  DO NOT make any important/legal decisions  Have someone stay with you for at least 24 hours    To allow proper healing and prevent bleeding, the following activities are to be strictly avoided for the next 24-48 hours:  Excessive bending at wound site  Straining (anything that would tense up muscles around the affected puncture site)  Lifting objects greater than 5 pounds, pushing, or pulling for 5 days  For Arm Cases:  No flexing at the puncture site, such as hammering, golfing, bowling, or swinging any objects  For Groin Cases:  Refrain from sexual activity  Refrain from running or vigorous walking  No prolonged sitting or standing  Limit stair climbing as much as possible    Keep the puncture site clean and dry.  You may remove the dressing tomorrow and replace it with a band-aid for at least one additional day.  Gently clean the site with mild soap and water.  No scrubbing/rubbing and lightly pat the area dry.  Showers are acceptable; however, avoid submerging in water (tub baths, hot tubs, swimming pools, dishwater, etc…) for at least one week.  The site should be completely healed before resuming these activities to reduce the risk of infection.  Check the site often.  Watch for signs and symptoms of infection and notify your physician if any of the following occur:  Bleeding or an increase in swelling at the puncture site  Fever  Increased soreness around puncture site  Foul odor or significant drainage from the puncture site  Swelling, redness, or warmth at  the puncture site    **A bruise or small “pea sized” lump under the skin at the puncture site is not unusual.  This should disappear within 3-4 weeks.**  CONTACT YOUR PHYSICIAN OR CALL 911 IF YOU EXPERIENCE ANY OF THE FOLLOWING:  Increased angina (chest pain) or frequent sensations of pressure, burning, pain, or other discomfort in the chest, arm, jaws, or stomach  Lightheadedness, dizziness, faint feeling, sweating, or difficulty breathing  Odd sensation changes like numbness, tingling, coldness, or pain in the arm or leg in which the catheter was inserted  Limb in which the catheter was inserted becomes pale/bluish in color    IMPORTANT:  Although this occurs very rarely, if you should develop bright red or excessive bleeding, feel a “pop” inside at the insertion site, or notice a sudden increase in swelling larger than a walnut, you should call 911.  Hold continuous firm pressure to the access site until emergency personnel arrive.  It is best if someone else can do this for you.

## 2022-10-16 LAB
QT INTERVAL: 397 MS
QT INTERVAL: 527 MS

## 2022-10-19 ENCOUNTER — LAB (OUTPATIENT)
Dept: LAB | Facility: HOSPITAL | Age: 72
End: 2022-10-19

## 2022-10-19 ENCOUNTER — HOSPITAL ENCOUNTER (OUTPATIENT)
Dept: CARDIOLOGY | Facility: HOSPITAL | Age: 72
Discharge: HOME | End: 2022-10-19

## 2022-10-19 DIAGNOSIS — T82.118A PACEMAKER FAILURE, INITIAL ENCOUNTER: ICD-10-CM

## 2022-10-19 DIAGNOSIS — Z95.0 PRESENCE OF CARDIAC PACEMAKER: ICD-10-CM

## 2022-10-19 LAB
ANION GAP SERPL CALCULATED.3IONS-SCNC: 11.6 MMOL/L (ref 5–15)
BUN SERPL-MCNC: 25 MG/DL (ref 8–23)
BUN/CREAT SERPL: 22.1 (ref 7–25)
CALCIUM SPEC-SCNC: 9 MG/DL (ref 8.6–10.5)
CHLORIDE SERPL-SCNC: 104 MMOL/L (ref 98–107)
CO2 SERPL-SCNC: 23.4 MMOL/L (ref 22–29)
CREAT SERPL-MCNC: 1.13 MG/DL (ref 0.57–1)
DEPRECATED RDW RBC AUTO: 39.5 FL (ref 37–54)
EGFRCR SERPLBLD CKD-EPI 2021: 52.4 ML/MIN/1.73
ERYTHROCYTE [DISTWIDTH] IN BLOOD BY AUTOMATED COUNT: 12.8 % (ref 12.3–15.4)
GLUCOSE SERPL-MCNC: 73 MG/DL (ref 65–99)
HCT VFR BLD AUTO: 40.8 % (ref 34–46.6)
HGB BLD-MCNC: 13.2 G/DL (ref 12–15.9)
MCH RBC QN AUTO: 27.4 PG (ref 26.6–33)
MCHC RBC AUTO-ENTMCNC: 32.4 G/DL (ref 31.5–35.7)
MCV RBC AUTO: 84.8 FL (ref 79–97)
MRSA DNA SPEC QL NAA+PROBE: NORMAL
PLATELET # BLD AUTO: 318 10*3/MM3 (ref 140–450)
PMV BLD AUTO: 10.7 FL (ref 6–12)
POTASSIUM SERPL-SCNC: 4 MMOL/L (ref 3.5–5.2)
RBC # BLD AUTO: 4.81 10*6/MM3 (ref 3.77–5.28)
SODIUM SERPL-SCNC: 139 MMOL/L (ref 136–145)
WBC NRBC COR # BLD: 10.05 10*3/MM3 (ref 3.4–10.8)

## 2022-10-19 PROCEDURE — 80048 BASIC METABOLIC PNL TOTAL CA: CPT

## 2022-10-19 PROCEDURE — 93010 ELECTROCARDIOGRAM REPORT: CPT | Performed by: INTERNAL MEDICINE

## 2022-10-19 PROCEDURE — 93005 ELECTROCARDIOGRAM TRACING: CPT | Performed by: INTERNAL MEDICINE

## 2022-10-19 PROCEDURE — 36415 COLL VENOUS BLD VENIPUNCTURE: CPT

## 2022-10-19 PROCEDURE — 87641 MR-STAPH DNA AMP PROBE: CPT

## 2022-10-19 PROCEDURE — 85027 COMPLETE CBC AUTOMATED: CPT

## 2022-10-21 ENCOUNTER — HOSPITAL ENCOUNTER (OUTPATIENT)
Facility: HOSPITAL | Age: 72
Setting detail: HOSPITAL OUTPATIENT SURGERY
Discharge: HOME | End: 2022-10-21
Attending: INTERNAL MEDICINE | Admitting: INTERNAL MEDICINE

## 2022-10-21 VITALS
TEMPERATURE: 98.1 F | HEIGHT: 63 IN | HEART RATE: 63 BPM | DIASTOLIC BLOOD PRESSURE: 58 MMHG | BODY MASS INDEX: 44.77 KG/M2 | OXYGEN SATURATION: 100 % | WEIGHT: 252.65 LBS | RESPIRATION RATE: 17 BRPM | SYSTOLIC BLOOD PRESSURE: 123 MMHG

## 2022-10-21 DIAGNOSIS — Z95.0 PRESENCE OF CARDIAC PACEMAKER: ICD-10-CM

## 2022-10-21 LAB — GLUCOSE BLDC GLUCOMTR-MCNC: 83 MG/DL (ref 70–105)

## 2022-10-21 PROCEDURE — C1785 PMKR, DUAL, RATE-RESP: HCPCS | Performed by: INTERNAL MEDICINE

## 2022-10-21 PROCEDURE — 25010000002 FENTANYL CITRATE (PF) 50 MCG/ML SOLUTION: Performed by: INTERNAL MEDICINE

## 2022-10-21 PROCEDURE — 99153 MOD SED SAME PHYS/QHP EA: CPT | Performed by: INTERNAL MEDICINE

## 2022-10-21 PROCEDURE — 33228 REMV&REPLC PM GEN DUAL LEAD: CPT | Performed by: INTERNAL MEDICINE

## 2022-10-21 PROCEDURE — C1889 IMPLANT/INSERT DEVICE, NOC: HCPCS | Performed by: INTERNAL MEDICINE

## 2022-10-21 PROCEDURE — 93005 ELECTROCARDIOGRAM TRACING: CPT | Performed by: INTERNAL MEDICINE

## 2022-10-21 PROCEDURE — 25010000002 VANCOMYCIN 1 G RECONSTITUTED SOLUTION 1 EACH VIAL: Performed by: INTERNAL MEDICINE

## 2022-10-21 PROCEDURE — 82962 GLUCOSE BLOOD TEST: CPT

## 2022-10-21 PROCEDURE — 25010000002 MIDAZOLAM PER 1 MG: Performed by: INTERNAL MEDICINE

## 2022-10-21 PROCEDURE — 25010000002 FENTANYL CITRATE (PF) 100 MCG/2ML SOLUTION: Performed by: INTERNAL MEDICINE

## 2022-10-21 PROCEDURE — 99152 MOD SED SAME PHYS/QHP 5/>YRS: CPT | Performed by: INTERNAL MEDICINE

## 2022-10-21 DEVICE — ENV PM AIGISRX ANTIBAC RESORB 2.5X2.7IN MD: Type: IMPLANTABLE DEVICE | Status: FUNCTIONAL

## 2022-10-21 DEVICE — GEN PM AZURE XT SURESCAN DR MRI: Type: IMPLANTABLE DEVICE | Status: FUNCTIONAL

## 2022-10-21 RX ORDER — FENTANYL CITRATE 50 UG/ML
INJECTION, SOLUTION INTRAMUSCULAR; INTRAVENOUS
Status: DISCONTINUED | OUTPATIENT
Start: 2022-10-21 | End: 2022-10-21 | Stop reason: HOSPADM

## 2022-10-21 RX ORDER — IBUPROFEN 400 MG/1
400 TABLET ORAL EVERY 6 HOURS PRN
Status: DISCONTINUED | OUTPATIENT
Start: 2022-10-21 | End: 2022-10-21 | Stop reason: HOSPADM

## 2022-10-21 RX ORDER — MIDAZOLAM HYDROCHLORIDE 1 MG/ML
INJECTION INTRAMUSCULAR; INTRAVENOUS
Status: DISCONTINUED | OUTPATIENT
Start: 2022-10-21 | End: 2022-10-21 | Stop reason: HOSPADM

## 2022-10-21 RX ORDER — SODIUM CHLORIDE 0.9 % (FLUSH) 0.9 %
10 SYRINGE (ML) INJECTION AS NEEDED
Status: DISCONTINUED | OUTPATIENT
Start: 2022-10-21 | End: 2022-10-21 | Stop reason: HOSPADM

## 2022-10-21 RX ORDER — LIDOCAINE HYDROCHLORIDE AND EPINEPHRINE BITARTRATE 20; .01 MG/ML; MG/ML
INJECTION, SOLUTION SUBCUTANEOUS
Status: DISCONTINUED | OUTPATIENT
Start: 2022-10-21 | End: 2022-10-21 | Stop reason: HOSPADM

## 2022-10-21 RX ORDER — ACETAMINOPHEN 325 MG/1
650 TABLET ORAL EVERY 4 HOURS PRN
Status: DISCONTINUED | OUTPATIENT
Start: 2022-10-21 | End: 2022-10-21 | Stop reason: HOSPADM

## 2022-10-21 RX ORDER — SODIUM CHLORIDE 0.9 % (FLUSH) 0.9 %
10 SYRINGE (ML) INJECTION EVERY 12 HOURS SCHEDULED
Status: DISCONTINUED | OUTPATIENT
Start: 2022-10-21 | End: 2022-10-21 | Stop reason: HOSPADM

## 2022-10-21 RX ORDER — ACETAMINOPHEN 650 MG/1
650 SUPPOSITORY RECTAL EVERY 4 HOURS PRN
Status: DISCONTINUED | OUTPATIENT
Start: 2022-10-21 | End: 2022-10-21 | Stop reason: HOSPADM

## 2022-10-21 RX ADMIN — VANCOMYCIN HYDROCHLORIDE 1000 MG: 1 INJECTION, POWDER, LYOPHILIZED, FOR SOLUTION INTRAVENOUS at 09:53

## 2022-10-21 NOTE — DISCHARGE INSTRUCTIONS
Pacemaker DC Instructions    After Procedure:    Avoid shoulder motion for the first twenty-four (24) hours.  Wear arm sling, if ordered by physician, to help decrease the use of arm on operative side.    After Discharge:    Carry your pacemaker identification card in your wallet or purse  We recommend you wear a MEDIC-ALERT bracelet or necklace to alert medical personnel  A topical skin adhesive may be used to close the incision.  DO NOT rub, scratch, or pick at the wound.  Keep wound dry.  Avoid topical ointments.  Protect the wound from prolonged exposure to sunlight.  If steri strips are used, they should be left in place until seen by physician.  No driving until after your follow-up appointment with the Cardiologist.     **Please be sure to read the pacemaker booklet given to you at time of discharge**

## 2022-10-30 LAB
QT INTERVAL: 401 MS
QT INTERVAL: 458 MS

## 2022-10-31 ENCOUNTER — TELEPHONE (OUTPATIENT)
Dept: CARDIOLOGY | Facility: CLINIC | Age: 72
End: 2022-10-31

## 2022-10-31 NOTE — TELEPHONE ENCOUNTER
Caller: Raya May    Relationship to patient: Self    Best call back number: [phone]    Patient is needing: PT CAME IN ON Friday TO GET HER BATTERY REPLACED FOR HER PACEMAKER AND THE BANDAGE IS ALREADY COMING OFF. SHE TRIED TO APPLY TAPE, BUT IT STILL IS NOT WORKING. PLEASE ADVISE. HUB WAS UNABLE TO WT

## 2022-10-31 NOTE — TELEPHONE ENCOUNTER
Spoke with patient - steri strips still in tact. Advised to keep those on and try to avoid getting site wet. Nurse will see her wednesday in office and apply new bandage if needed.   Understood

## 2022-11-02 ENCOUNTER — TELEPHONE (OUTPATIENT)
Dept: CARDIOLOGY | Facility: CLINIC | Age: 72
End: 2022-11-02

## 2022-11-02 ENCOUNTER — CLINICAL SUPPORT NO REQUIREMENTS (OUTPATIENT)
Dept: CARDIOLOGY | Facility: CLINIC | Age: 72
End: 2022-11-02

## 2022-11-02 DIAGNOSIS — R00.1 BRADYCARDIA, SINUS: ICD-10-CM

## 2022-11-02 DIAGNOSIS — Z95.0 PACEMAKER: Primary | ICD-10-CM

## 2022-11-02 PROCEDURE — 93280 PM DEVICE PROGR EVAL DUAL: CPT | Performed by: INTERNAL MEDICINE

## 2022-11-02 NOTE — PROGRESS NOTES
Patient is here today s/p pacemaker replacement. Removed dressing and steri strips with no issues. Area in center of incision has superficial area approximately 1 cm long, applied a Band-Aid.  Interrogation of the device is normal, leads stable. Follow up appt scheduled with Dr Posey.

## 2022-11-02 NOTE — TELEPHONE ENCOUNTER
Patient was schedule this morning for pacemaker replacement incision check. She could not wait until this afternoon for an appt with you. Incision well approximated, no drainage, redness or s/s of infection noted. Center of incision has an area approximately 1 cm long that has superficial layer of skin open. Applied a band aid.    Do you want to see her, she has an appt scheduled 1/12/23.    (patient needs to send remote device check to connect with new monitor)

## 2022-12-08 PROCEDURE — 93294 REM INTERROG EVL PM/LDLS PM: CPT | Performed by: INTERNAL MEDICINE

## 2022-12-08 PROCEDURE — 93296 REM INTERROG EVL PM/IDS: CPT | Performed by: INTERNAL MEDICINE

## 2023-01-23 RX ORDER — NITROGLYCERIN 0.4 MG/1
0.4 TABLET SUBLINGUAL
Qty: 25 TABLET | Refills: 0 | Status: SHIPPED | OUTPATIENT
Start: 2023-01-23

## 2023-01-23 NOTE — TELEPHONE ENCOUNTER
Caller: Raya May    Relationship: Self    Best call back number: [phone]    Requested Prescriptions:   Requested Prescriptions     Pending Prescriptions Disp Refills   • nitroglycerin (NITROSTAT) 0.4 MG SL tablet       Sig: Place 1 tablet under the tongue Every 5 (Five) Minutes As Needed.        Pharmacy where request should be sent: [facility] - [place], [place] - [address] [phone] [place] [phone] FX         Does the patient have less than a 3 day supply:  [x] Yes  [] No    Would you like a call back once the refill request has been completed: [x] Yes [] No    If the office needs to give you a call back, can they leave a voicemail: [x] Yes [] No    Salome Bedolla Rep   01/23/23 09:22 EST

## 2023-01-23 NOTE — TELEPHONE ENCOUNTER
Rx Refill Note  Requested Prescriptions     Pending Prescriptions Disp Refills   • nitroglycerin (NITROSTAT) 0.4 MG SL tablet       Sig: Place 1 tablet under the tongue Every 5 (Five) Minutes As Needed.      Last office visit with prescribing clinician: 10/10/2022   Last telemedicine visit with prescribing clinician: 4/5/2023   Next office visit with prescribing clinician: 4/5/2023                         Would you like a call back once the refill request has been completed: [] Yes [] No    If the office needs to give you a call back, can they leave a voicemail: [] Yes [] No    Sheyla Griffin MA  01/23/23, 09:23 EST

## 2023-06-12 ENCOUNTER — TELEPHONE (OUTPATIENT)
Dept: CARDIOLOGY | Facility: CLINIC | Age: 73
End: 2023-06-12
Payer: MEDICARE

## 2023-06-12 NOTE — TELEPHONE ENCOUNTER
Caller: Raya May    Relationship to patient: Self    Best call back number: [phone]        Type of visit: DEVICE CHECK / FOLLOW UP    Requested date: FIRST PART OF JULY    If rescheduling, when is the original appointment:  06-14-23, RESCHEDULED TO 07-05 & 08-02. WOULD LIKE TO HAVE THEM SCHEDULED TOGETHER.

## 2023-07-19 PROBLEM — I48.91 ATRIAL FIBRILLATION AND FLUTTER: Status: ACTIVE | Noted: 2021-05-20

## 2023-07-19 PROBLEM — E78.5 HYPERLIPIDEMIA: Status: ACTIVE | Noted: 2023-07-19

## 2023-07-19 PROBLEM — E83.51 HYPOCALCEMIA: Status: ACTIVE | Noted: 2023-07-19

## 2023-07-19 PROBLEM — R06.02 SOB (SHORTNESS OF BREATH): Status: ACTIVE | Noted: 2021-06-28

## 2023-07-19 PROBLEM — Z01.810 ENCOUNTER FOR PREPROCEDURAL CARDIOVASCULAR EXAMINATION: Status: ACTIVE | Noted: 2017-06-15

## 2023-07-19 PROBLEM — R79.89 ELEVATED D-DIMER: Status: ACTIVE | Noted: 2023-07-19

## 2023-07-19 PROBLEM — I48.92 ATRIAL FIBRILLATION AND FLUTTER: Status: ACTIVE | Noted: 2021-05-20

## 2023-09-13 ENCOUNTER — TRANSCRIBE ORDERS (OUTPATIENT)
Dept: ADMINISTRATIVE | Facility: HOSPITAL | Age: 73
End: 2023-09-13
Payer: MEDICARE

## 2023-09-13 DIAGNOSIS — I20.1 VARIANT ANGINA: Primary | ICD-10-CM

## 2024-04-27 PROCEDURE — 93296 REM INTERROG EVL PM/IDS: CPT | Performed by: INTERNAL MEDICINE

## 2024-04-27 PROCEDURE — 93294 REM INTERROG EVL PM/LDLS PM: CPT | Performed by: INTERNAL MEDICINE

## 2024-10-31 ENCOUNTER — TELEPHONE (OUTPATIENT)
Dept: CARDIOLOGY | Facility: CLINIC | Age: 74
End: 2024-10-31
Payer: MEDICARE

## 2024-10-31 NOTE — TELEPHONE ENCOUNTER
Request through [facility] to transfer to [facility]. Called patient, no answer. Unable to confirm transfer request.

## 2024-11-05 NOTE — TELEPHONE ENCOUNTER
"Patient has an upcoming appt to see Dr Rodriguez with [facility]. Transfer [facility] to [facility] for continuity of care.  "

## (undated) DEVICE — GW PTFE EMERALD HEPCOAT FC J TIP STD .035 3MM 150CM

## (undated) DEVICE — COVER,LIGHT HANDLE,FLX,1/PK: Brand: MEDLINE INDUSTRIES, INC.

## (undated) DEVICE — STCK MANICURE 144BX

## (undated) DEVICE — VIOLET BRAIDED (POLYGLACTIN 910), SYNTHETIC ABSORBABLE SUTURE: Brand: COATED VICRYL

## (undated) DEVICE — UNDYED BRAIDED (POLYGLACTIN 910), SYNTHETIC ABSORBABLE SUTURE: Brand: COATED VICRYL

## (undated) DEVICE — CATH DIAG IMPULSE FR4 5F 100CM

## (undated) DEVICE — CATH DIAG IMPULSE PIG 5F 100CM

## (undated) DEVICE — SUT VIC DEXON PRE 4 4/0 18IN J496G

## (undated) DEVICE — CABL BIPOL W/ALLGTR CLIP/SM 12FT

## (undated) DEVICE — 3M™ PATIENT PLATE, CORDED, SPLIT, LARGE, 40 PER CASE, 1179: Brand: 3M™

## (undated) DEVICE — PLASMABLADE PS200-040 4.0: Brand: PLASMABLADE™

## (undated) DEVICE — PK TRY HEART CATH 50

## (undated) DEVICE — ELECTRD DEFIB M/FUNC PROPADZ RADIOL 2PK

## (undated) DEVICE — ADHS LIQ MASTISOL 2/3ML

## (undated) DEVICE — PACEMAKER CDS: Brand: MEDLINE INDUSTRIES, INC.

## (undated) DEVICE — PINNACLE INTRODUCER SHEATH: Brand: PINNACLE

## (undated) DEVICE — CATH DIAG IMPULSE FL4 5F 100CM